# Patient Record
Sex: MALE | Race: WHITE | NOT HISPANIC OR LATINO | URBAN - METROPOLITAN AREA
[De-identification: names, ages, dates, MRNs, and addresses within clinical notes are randomized per-mention and may not be internally consistent; named-entity substitution may affect disease eponyms.]

---

## 2023-01-27 ENCOUNTER — HOSPITAL ENCOUNTER (EMERGENCY)
Facility: HOSPITAL | Age: 23
Discharge: HOME | End: 2023-01-27
Attending: EMERGENCY MEDICINE

## 2023-01-27 VITALS
OXYGEN SATURATION: 100 % | SYSTOLIC BLOOD PRESSURE: 171 MMHG | DIASTOLIC BLOOD PRESSURE: 88 MMHG | HEART RATE: 94 BPM | RESPIRATION RATE: 16 BRPM | TEMPERATURE: 99.2 F | WEIGHT: 185 LBS | BODY MASS INDEX: 29.03 KG/M2 | HEIGHT: 67 IN

## 2023-01-27 DIAGNOSIS — K92.0 HEMATEMESIS: ICD-10-CM

## 2023-01-27 DIAGNOSIS — R19.7 DIARRHEA: ICD-10-CM

## 2023-01-27 DIAGNOSIS — I10 HYPERTENSION: ICD-10-CM

## 2023-01-27 DIAGNOSIS — R10.32 LEFT LOWER QUADRANT ABDOMINAL PAIN: Primary | ICD-10-CM

## 2023-01-27 LAB
ALBUMIN SERPL BCP-MCNC: 4.4 G/DL (ref 3.5–5)
ALP SERPL-CCNC: 63 U/L (ref 46–116)
ALT SERPL W P-5'-P-CCNC: 26 U/L (ref 12–78)
ANION GAP SERPL CALCULATED.3IONS-SCNC: 10 MMOL/L (ref 4–13)
AST SERPL W P-5'-P-CCNC: 43 U/L (ref 5–45)
BASOPHILS # BLD AUTO: 0.05 THOUSANDS/ÂΜL (ref 0–0.1)
BASOPHILS NFR BLD AUTO: 1 % (ref 0–1)
BILIRUB SERPL-MCNC: 0.67 MG/DL (ref 0.2–1)
BUN SERPL-MCNC: 11 MG/DL (ref 5–25)
CALCIUM SERPL-MCNC: 9.6 MG/DL (ref 8.3–10.1)
CHLORIDE SERPL-SCNC: 106 MMOL/L (ref 96–108)
CO2 SERPL-SCNC: 21 MMOL/L (ref 21–32)
CREAT SERPL-MCNC: 1.06 MG/DL (ref 0.6–1.3)
EOSINOPHIL # BLD AUTO: 0.02 THOUSAND/ÂΜL (ref 0–0.61)
EOSINOPHIL NFR BLD AUTO: 0 % (ref 0–6)
ERYTHROCYTE [DISTWIDTH] IN BLOOD BY AUTOMATED COUNT: 11.9 % (ref 11.6–15.1)
GFR SERPL CREATININE-BSD FRML MDRD: 98 ML/MIN/1.73SQ M
GLUCOSE SERPL-MCNC: 73 MG/DL (ref 65–140)
HCT VFR BLD AUTO: 46.9 % (ref 36.5–49.3)
HGB BLD-MCNC: 16 G/DL (ref 12–17)
IMM GRANULOCYTES # BLD AUTO: 0.02 THOUSAND/UL (ref 0–0.2)
IMM GRANULOCYTES NFR BLD AUTO: 0 % (ref 0–2)
LIPASE SERPL-CCNC: 81 U/L (ref 73–393)
LYMPHOCYTES # BLD AUTO: 2.44 THOUSANDS/ÂΜL (ref 0.6–4.47)
LYMPHOCYTES NFR BLD AUTO: 33 % (ref 14–44)
MCH RBC QN AUTO: 31.1 PG (ref 26.8–34.3)
MCHC RBC AUTO-ENTMCNC: 34.1 G/DL (ref 31.4–37.4)
MCV RBC AUTO: 91 FL (ref 82–98)
MONOCYTES # BLD AUTO: 0.93 THOUSAND/ÂΜL (ref 0.17–1.22)
MONOCYTES NFR BLD AUTO: 13 % (ref 4–12)
NEUTROPHILS # BLD AUTO: 4 THOUSANDS/ÂΜL (ref 1.85–7.62)
NEUTS SEG NFR BLD AUTO: 53 % (ref 43–75)
NRBC BLD AUTO-RTO: 0 /100 WBCS
PLATELET # BLD AUTO: 304 THOUSANDS/UL (ref 149–390)
PMV BLD AUTO: 10 FL (ref 8.9–12.7)
POTASSIUM SERPL-SCNC: 3.7 MMOL/L (ref 3.5–5.3)
PROT SERPL-MCNC: 7.9 G/DL (ref 6.4–8.4)
RBC # BLD AUTO: 5.14 MILLION/UL (ref 3.88–5.62)
SODIUM SERPL-SCNC: 137 MMOL/L (ref 135–147)
WBC # BLD AUTO: 7.46 THOUSAND/UL (ref 4.31–10.16)

## 2023-01-27 RX ORDER — ONDANSETRON 2 MG/ML
4 INJECTION INTRAMUSCULAR; INTRAVENOUS ONCE
Status: COMPLETED | OUTPATIENT
Start: 2023-01-27 | End: 2023-01-27

## 2023-01-27 RX ORDER — KETOROLAC TROMETHAMINE 30 MG/ML
15 INJECTION, SOLUTION INTRAMUSCULAR; INTRAVENOUS ONCE
Status: COMPLETED | OUTPATIENT
Start: 2023-01-27 | End: 2023-01-27

## 2023-01-27 RX ORDER — DICYCLOMINE HCL 20 MG
20 TABLET ORAL ONCE
Status: DISCONTINUED | OUTPATIENT
Start: 2023-01-27 | End: 2023-01-27 | Stop reason: HOSPADM

## 2023-01-27 RX ORDER — ONDANSETRON 4 MG/1
4 TABLET, FILM COATED ORAL EVERY 6 HOURS PRN
Qty: 12 TABLET | Refills: 0 | Status: SHIPPED | OUTPATIENT
Start: 2023-01-27

## 2023-01-27 RX ORDER — DICYCLOMINE HCL 20 MG
20 TABLET ORAL 2 TIMES DAILY PRN
Qty: 20 TABLET | Refills: 0 | Status: SHIPPED | OUTPATIENT
Start: 2023-01-27

## 2023-01-27 RX ADMIN — KETOROLAC TROMETHAMINE 15 MG: 30 INJECTION, SOLUTION INTRAMUSCULAR at 18:16

## 2023-01-27 RX ADMIN — ONDANSETRON 4 MG: 2 INJECTION INTRAMUSCULAR; INTRAVENOUS at 18:16

## 2023-01-27 RX ADMIN — SODIUM CHLORIDE 1000 ML: 0.9 INJECTION, SOLUTION INTRAVENOUS at 18:16

## 2023-01-27 NOTE — ED PROVIDER NOTES
History  Chief Complaint   Patient presents with   • Abdominal Pain     Llq pain x approx 1 week associated with nausea and diarrhea  Denies urinary s/s no active vomiting  29-year-old male with no known past medical history presents with abdominal pain  Patient reports that about a month ago he got a "GI bug" that lasted for about a week  He notes severe abdominal pain with multiple episodes of vomiting and diarrhea today  He states that this went away, but his nausea persisted  He states that he has had decreased appetite for the past month  Unsure of weight loss  Patient states that a few days ago the abdominal pain, vomiting, and diarrhea came back  The diarrhea has always been watery  He reports that one episode of vomiting yesterday looked like it was red  Patient states that he did not eat any red foods  Since then, patient has not had any vomiting  He still feels nauseous and has had decreased appetite  Patient states that when he has the abdominal pain it is in his left lower quadrant  It is a crampy pain  Patient has not had any fevers  No sick contacts  No recent travel  Patient has not tried any medications for his symptoms  None       History reviewed  No pertinent past medical history  History reviewed  No pertinent surgical history  History reviewed  No pertinent family history  I have reviewed and agree with the history as documented  E-Cigarette/Vaping     E-Cigarette/Vaping Substances           Review of Systems   Constitutional: Positive for appetite change  Negative for chills, fatigue and fever  HENT: Negative  Eyes: Negative  Respiratory: Negative for cough, chest tightness and shortness of breath  Cardiovascular: Negative for chest pain and palpitations  Gastrointestinal: Positive for abdominal pain, diarrhea, nausea and vomiting  Negative for blood in stool  Endocrine: Negative      Genitourinary: Negative for difficulty urinating and hematuria  Musculoskeletal: Negative for arthralgias and myalgias  Skin: Negative for pallor and rash  Allergic/Immunologic: Negative  Neurological: Negative for dizziness, weakness, light-headedness and headaches  Hematological: Negative  Physical Exam  ED Triage Vitals [01/27/23 1643]   Temperature Pulse Respirations Blood Pressure SpO2   99 2 °F (37 3 °C) 94 16 (!) 171/88 100 %      Temp Source Heart Rate Source Patient Position - Orthostatic VS BP Location FiO2 (%)   Tympanic -- -- -- --      Pain Score       4             Orthostatic Vital Signs  Vitals:    01/27/23 1643   BP: (!) 171/88   Pulse: 94       Physical Exam  Vitals and nursing note reviewed  Constitutional:       General: He is not in acute distress  Appearance: Normal appearance  He is well-developed  He is not ill-appearing  HENT:      Head: Normocephalic and atraumatic  Nose: Nose normal    Eyes:      Conjunctiva/sclera: Conjunctivae normal    Cardiovascular:      Rate and Rhythm: Normal rate and regular rhythm  Pulmonary:      Effort: Pulmonary effort is normal  No respiratory distress  Abdominal:      General: Abdomen is flat  There is no distension  Palpations: Abdomen is soft  Tenderness: There is no abdominal tenderness  There is no guarding or rebound  Musculoskeletal:         General: Normal range of motion  Cervical back: Normal range of motion and neck supple  Skin:     General: Skin is warm and dry  Neurological:      General: No focal deficit present  Mental Status: He is alert and oriented to person, place, and time           ED Medications  Medications   sodium chloride 0 9 % bolus 1,000 mL (0 mL Intravenous Stopped 1/27/23 1905)   ondansetron (ZOFRAN) injection 4 mg (4 mg Intravenous Given 1/27/23 1816)   ketorolac (TORADOL) injection 15 mg (15 mg Intravenous Given 1/27/23 1816)       Diagnostic Studies  Results Reviewed     Procedure Component Value Units Date/Time Comprehensive metabolic panel [[id number]] Collected: 01/27/23 1815    Lab Status: Final result Specimen: Blood from Arm, Left Updated: 01/27/23 1849     Sodium 137 mmol/L      Potassium 3 7 mmol/L      Chloride 106 mmol/L      CO2 21 mmol/L      ANION GAP 10 mmol/L      BUN 11 mg/dL      Creatinine 1 06 mg/dL      Glucose 73 mg/dL      Calcium 9 6 mg/dL      AST 43 U/L      ALT 26 U/L      Alkaline Phosphatase 63 U/L      Total Protein 7 9 g/dL      Albumin 4 4 g/dL      Total Bilirubin 0 67 mg/dL      eGFR 98 ml/min/1 73sq m     Narrative:      [employer] guidelines for Chronic Kidney Disease (CKD):   •  Stage 1 with normal or high GFR (GFR > 90 mL/min/1 73 square meters)  •  Stage 2 Mild CKD (GFR = 60-89 mL/min/1 73 square meters)  •  Stage 3A Moderate CKD (GFR = 45-59 mL/min/1 73 square meters)  •  Stage 3B Moderate CKD (GFR = 30-44 mL/min/1 73 square meters)  •  Stage 4 Severe CKD (GFR = 15-29 mL/min/1 73 square meters)  •  Stage 5 End Stage CKD (GFR <15 mL/min/1 73 square meters)  Note: GFR calculation is accurate only with a steady state creatinine    Lipase [[id number]]  (Normal) Collected: 01/27/23 1815    Lab Status: Final result Specimen: Blood from Arm, Left Updated: 01/27/23 1842     Lipase 81 u/L     CBC and differential [[id number]]  (Abnormal) Collected: 01/27/23 1815    Lab Status: Final result Specimen: Blood from Arm, Left Updated: 01/27/23 1822     WBC 7 46 Thousand/uL      RBC 5 14 Million/uL      Hemoglobin 16 0 g/dL      Hematocrit 46 9 %      MCV 91 fL      MCH 31 1 pg      MCHC 34 1 g/dL      RDW 11 9 %      MPV 10 0 fL      Platelets 797 Thousands/uL      nRBC 0 /100 WBCs      Neutrophils Relative 53 %      Immat GRANS % 0 %      Lymphocytes Relative 33 %      Monocytes Relative 13 %      Eosinophils Relative 0 %      Basophils Relative 1 %      Neutrophils Absolute 4 00 Thousands/µL      Immature Grans Absolute 0 02 Thousand/uL      Lymphocytes Absolute 2 44 Thousands/µL      Monocytes Absolute 0 93 Thousand/µL      Eosinophils Absolute 0 02 Thousand/µL      Basophils Absolute 0 05 Thousands/µL                  No orders to display         Procedures  Procedures      ED Course  ED Course as of 01/28/23 2151   Fri Jan 27, 2023   1850 Nausea better, but still having discomfort  Will add toradol  Pt ok with discharge                                       Medical Decision Making  58-year-old male presents with abdominal pain, vomiting, and diarrhea  Patient is well-appearing  No abdominal tenderness on exam   Differential includes, but is not limited to: IBS, IBD, gastroenteritis, or other acute abdominal process  Will order labs since patient has had symptoms on and off for the past month  Will give IV fluids and medications and reevaluate  Patient will likely need GI follow-up for scope  Patient felt better after medications  He was given a prescription for Bentyl and Zofran  Discussed all results and plans with patient  Recommend follow up with gastroenterology  Return precautions given  All questions answered  Diarrhea: acute illness or injury  Hematemesis: acute illness or injury  Hypertension: acute illness or injury  Left lower quadrant abdominal pain: acute illness or injury  Amount and/or Complexity of Data Reviewed  External Data Reviewed:      Details: Reviewed past medical history  Labs: ordered  Risk  OTC drugs  Prescription drug management              Disposition  Final diagnoses:   Left lower quadrant abdominal pain   Diarrhea   Hematemesis   Hypertension     Time reflects when diagnosis was documented in both MDM as applicable and the Disposition within this note     Time User Action Codes Description Comment    1/27/2023  6:23 PM Shaun Mcbride Add [R10 32] Left lower quadrant abdominal pain     1/27/2023  6:24 PM Shaun Mcbride Add [R19 7] Diarrhea     1/27/2023  6:24 PM Shan Jerez [K92 0] Hematemesis     1/27/2023 6:24 PM Madhu Stevenson Add [I10] Hypertension       ED Disposition     ED Disposition   Discharge    Condition   Good    Date/Time   Fri Jan 27, 2023  6:55 PM    Comment   Gerry Ch discharge to home/self care  Follow-up Information     Follow up With Specialties Details Why Contact Info    Janey Krueger MD Gastroenterology   [address]  [phone]            Discharge Medication List as of 1/27/2023  7:46 PM      START taking these medications    Details   dicyclomine (BENTYL) 20 mg tablet Take 1 tablet (20 mg total) by mouth 2 (two) times a day as needed (abdominal pain), Starting Fri 1/27/2023, Normal      ondansetron (ZOFRAN) 4 mg tablet Take 1 tablet (4 mg total) by mouth every 6 (six) hours as needed for nausea or vomiting, Starting Fri 1/27/2023, Normal               PDMP Review     None           ED Provider  Attending physically available and evaluated Gerry Ch  I managed the patient along with the ED Attending      Electronically Signed by         Adam Bernal DO  01/28/23 3955

## 2023-01-27 NOTE — ED ATTENDING ATTESTATION
1/27/2023  ISusanne MD, saw and evaluated the patient  I have discussed the patient with the resident/non-physician practitioner and agree with the resident's/non-physician practitioner's findings, Plan of Care, and MDM as documented in the resident's/non-physician practitioner's note, except where noted  All available labs and Radiology studies were reviewed  I was present for key portions of any procedure(s) performed by the resident/non-physician practitioner and I was immediately available to provide assistance  At this point I agree with the current assessment done in the Emergency Department  I have conducted an independent evaluation of this patient a history and physical is as follows:    ED Course     18-year-old male, previously healthy, presenting to the emergency department for evaluation of left lower quadrant abdominal pain and diarrhea  Patient states that approximately 1 month ago he had an illness including nausea, vomiting, diarrhea that lasted for approximately a week, was very severe per patient description, and the diarrhea resolved, however the patient has had continuous nausea since that time  Patient states that 3 days ago he had return of vomiting and diarrhea  He states that there was some reddish discoloration in the vomitus like there was blood present  Since that time, the vomiting has improved, however patient has continued to have left lower quadrant abdominal discomfort and watery diarrhea  Patient denies any blood in the diarrhea  Patient denies any mucus in the diarrhea  No fever with either illness  Patient describes the left lower quadrant discomfort as being continuous, not associated with urination or defecation, not truly described as a pain  10 systems reviewed and negative except as noted in the history of present illness  The patient is resting comfortably on a stretcher in no acute respiratory distress  The patient appears nontoxic   HEENT reveals moist mucous membranes  Head is normocephalic and atraumatic  Conjunctiva and sclera are normal  Neck is nontender and supple with full range of motion to flexion, extension, lateral rotation  No meningismus appreciated  No masses are appreciated  Lungs are clear to auscultation bilaterally without any wheezes, rales or rhonchi  Heart is regular rate and rhythm without any murmurs, rubs or gallops  Abdomen is soft and nontender without any rebound or guarding  Extremities appear grossly normal without any significant arthropathy  Patient is awake, alert, and oriented x3  The patient has normal interaction  Cranial nerves grossly intact  Motor is 5 out of 5 bilateral upper and lower extremities  MEDICAL DECISION MAKING    Number and Complexity of Problems  • Differential diagnosis: Differential diagnosis includes repetitive episodes of gastroenteritis, inflammatory bowel disease, more likely Crohn's than ulcerative colitis given lack of family history of ulcerative colitis, inflammatory bowel disease, less likely food intolerance like celiac sprue or lactose intolerance  Episode of upper GI bleeding likely secondary to gastritis versus Dominga-Jackson tear  Patient has no history of significant alcohol use to put him at risk for esophageal varices  Given single episode, unlikely to be bleeding peptic ulcer disease  Medical Decision Making Data  • External documents reviewed: No outside records available for review      No orders to display       Labs Reviewed   CBC AND DIFFERENTIAL - Abnormal       Result Value Ref Range Status    WBC 7 46  4 31 - 10 16 Thousand/uL Final    RBC 5 14  3 88 - 5 62 Million/uL Final    Hemoglobin 16 0  12 0 - 17 0 g/dL Final    Hematocrit 46 9  36 5 - 49 3 % Final    MCV 91  82 - 98 fL Final    MCH 31 1  26 8 - 34 3 pg Final    MCHC 34 1  31 4 - 37 4 g/dL Final    RDW 11 9  11 6 - 15 1 % Final    MPV 10 0  8 9 - 12 7 fL Final    Platelets 716  144 - 390 Thousands/uL Final nRBC 0  /100 WBCs Final    Neutrophils Relative 53  43 - 75 % Final    Immat GRANS % 0  0 - 2 % Final    Lymphocytes Relative 33  14 - 44 % Final    Monocytes Relative 13 (*) 4 - 12 % Final    Eosinophils Relative 0  0 - 6 % Final    Basophils Relative 1  0 - 1 % Final    Neutrophils Absolute 4 00  1 85 - 7 62 Thousands/µL Final    Immature Grans Absolute 0 02  0 00 - 0 20 Thousand/uL Final    Lymphocytes Absolute 2 44  0 60 - 4 47 Thousands/µL Final    Monocytes Absolute 0 93  0 17 - 1 22 Thousand/µL Final    Eosinophils Absolute 0 02  0 00 - 0 61 Thousand/µL Final    Basophils Absolute 0 05  0 00 - 0 10 Thousands/µL Final   LIPASE - Normal    Lipase 81  73 - 393 u/L Final   COMPREHENSIVE METABOLIC PANEL    Sodium 585  135 - 147 mmol/L Final    Potassium 3 7  3 5 - 5 3 mmol/L Final    Chloride 106  96 - 108 mmol/L Final    CO2 21  21 - 32 mmol/L Final    ANION GAP 10  4 - 13 mmol/L Final    BUN 11  5 - 25 mg/dL Final    Creatinine 1 06  0 60 - 1 30 mg/dL Final    Comment: Standardized to IDMS reference method    Glucose 73  65 - 140 mg/dL Final    Comment: If the patient is fasting, the ADA then defines impaired fasting glucose as > 100 mg/dL and diabetes as > or equal to 123 mg/dL  Specimen collection should occur prior to Sulfasalazine administration due to the potential for falsely depressed results  Specimen collection should occur prior to Sulfapyridine administration due to the potential for falsely elevated results  Calcium 9 6  8 3 - 10 1 mg/dL Final    AST 43  5 - 45 U/L Final    Comment: Specimen collection should occur prior to Sulfasalazine administration due to the potential for falsely depressed results  ALT 26  12 - 78 U/L Final    Comment: Specimen collection should occur prior to Sulfasalazine and/or Sulfapyridine administration due to the potential for falsely depressed results       Alkaline Phosphatase 63  46 - 116 U/L Final    Total Protein 7 9  6 4 - 8 4 g/dL Final    Albumin 4 4 3 5 - 5 0 g/dL Final    Total Bilirubin 0 67  0 20 - 1 00 mg/dL Final    Comment: Use of this assay is not recommended for patients undergoing treatment with eltrombopag due to the potential for falsely elevated results  eGFR 98  ml/min/1 73sq m Final    Narrative:     [place] guidelines for Chronic Kidney Disease (CKD):   •  Stage 1 with normal or high GFR (GFR > 90 mL/min/1 73 square meters)  •  Stage 2 Mild CKD (GFR = 60-89 mL/min/1 73 square meters)  •  Stage 3A Moderate CKD (GFR = 45-59 mL/min/1 73 square meters)  •  Stage 3B Moderate CKD (GFR = 30-44 mL/min/1 73 square meters)  •  Stage 4 Severe CKD (GFR = 15-29 mL/min/1 73 square meters)  •  Stage 5 End Stage CKD (GFR <15 mL/min/1 73 square meters)  Note: GFR calculation is accurate only with a steady state creatinine       • Labs reviewed by me are significant for: Normal hemoglobin  Normal BUN  No electrolyte abnormality  • Clinical decision rules/scores are significant for: Glascow Blatchford bleeding score of 0   2007 retrospective data suggest 99 6% of individuals with a low risk score of 0 do not require any medical intervention for upper GI bleed  • Considered admission for: Evaluation of upper GI bleed if hemoglobin significantly low  Treatment and Disposition  ED course: Patient was seen and examined by myself, underwent laboratory evaluation which was reviewed  Patient did not require any acute medication in the emergency department  Recommend follow-up as an outpatient with GI  Shared decision making: Patient is agreeable with plan as outlined above  Code status: Full code                Critical Care Time  Procedures

## 2023-01-28 NOTE — DISCHARGE INSTRUCTIONS
You have been seen for abdominal pain  You should return to the ED if you develop intractable vomiting, diarrhea, worsening pain, or other worsening symptoms  Follow up with GI  Use zofran as needed for nausea and bentyl as needed for pain

## 2023-02-15 ENCOUNTER — CONSULT (OUTPATIENT)
Dept: GASTROENTEROLOGY | Facility: CLINIC | Age: 23
End: 2023-02-15

## 2023-02-15 VITALS
TEMPERATURE: 97.9 F | HEIGHT: 67 IN | DIASTOLIC BLOOD PRESSURE: 64 MMHG | BODY MASS INDEX: 29.6 KG/M2 | WEIGHT: 188.6 LBS | SYSTOLIC BLOOD PRESSURE: 112 MMHG

## 2023-02-15 DIAGNOSIS — R11.2 NAUSEA AND VOMITING, UNSPECIFIED VOMITING TYPE: Primary | ICD-10-CM

## 2023-02-15 DIAGNOSIS — K92.0 HEMATEMESIS WITH NAUSEA: ICD-10-CM

## 2023-02-15 DIAGNOSIS — R10.32 LEFT LOWER QUADRANT ABDOMINAL PAIN: ICD-10-CM

## 2023-02-15 DIAGNOSIS — R19.7 ACUTE DIARRHEA: ICD-10-CM

## 2023-02-15 DIAGNOSIS — R10.9 ACUTE ABDOMINAL PAIN: ICD-10-CM

## 2023-02-15 RX ORDER — OMEPRAZOLE 40 MG/1
40 CAPSULE, DELAYED RELEASE ORAL DAILY
Qty: 30 CAPSULE | Refills: 5 | Status: SHIPPED | OUTPATIENT
Start: 2023-02-15 | End: 2023-08-14

## 2023-02-15 RX ORDER — TRAZODONE HYDROCHLORIDE 50 MG/1
TABLET ORAL
COMMUNITY
Start: 2023-02-02

## 2023-02-15 NOTE — PROGRESS NOTES
[facility] - Outpatient Consultation  Kirsty Hernandez 20 y o  male MRN: [id number]  Encounter: [id number]          ASSESSMENT AND PLAN:      1   Nausea and vomiting  2  Hematemesis  3  Marijuana use    Intermittent symptoms  Happening for the last 2 to 3 months  Patient has been using marijuana for the last 4 months  Differentials include peptic ulcer disease, uncontrolled heartburn, H  pylori infection, gastritis, symptoms related to marijuana use  At this time we will do EGD to investigate further  Start patient on omeprazole 40 mg daily  Further management plan based on investigation results  4   Acute abdominal pain  5  Acute diarrhea  6  Family history of celiac disease    Differentials include onset of celiac disease, IBD, infection  Symptoms are subsiding  At this time we will do stool studies for infection inflammation as well as get celiac serology     Further management plan based on investigation results  RTC 4 months  Grisel Rosa MD  Gastroenterology  [place] [address]  Date: February 15, 2023    - Ambulatory Referral to Gastroenterology  - ECG 12 lead; Future  - omeprazole (PriLOSEC) 40 MG capsule; Take 1 capsule (40 mg total) by mouth daily  Dispense: 30 capsule; Refill: 5  - Calprotectin,Fecal; Future  - Clostridium difficile Toxin/GDH W/Refl To PCR; Future  - Stool culture; Future  - Giardia lamblia, EIA and Ova and Parasites Examination; Future  - CT abdomen pelvis w contrast; Future  - EGD; Future  - Tissue transglutaminase, IgA; Future  - IgA; Future      ______________________________________________________________________    HPI: 79-year-old with no past medical history presents for evaluation  Patient had ER visit recently with nausea, vomiting, diarrhea and abdominal pain  CBC, CMP and lipase were normal     Patient reports that he has been having nausea for the last few months intermittently    Symptoms worsen with meal intake sometimes  He has had vomiting twice  He saw blood in vomiting 1 of those times  This was several weeks ago  No swallowing problems or heartburn  He experiences abdominal pain in the left lower quadrant along with loose stools in the last 2 to 3 weeks  Intermittent symptoms  Loose stools have decreased in frequency  About 1-2 bowel movements per day currently  No blood in stools  Weight stable  Grandmother had colon cancer  Grandfather had pancreatic cancer  He takes ibuprofen intermittently for headaches  Current marijuana use  Occasional alcohol intake  Non-smoker  REVIEW OF SYSTEMS:    CONSTITUTIONAL: Denies any fever, chills, rigors, and weight loss  HEENT: No earache or tinnitus  Denies hearing loss or visual disturbances  CARDIOVASCULAR: No chest pain or palpitations  RESPIRATORY: Denies any cough, hemoptysis, shortness of breath or dyspnea on exertion  GASTROINTESTINAL: As noted in the History of Present Illness  GENITOURINARY: No problems with urination  Denies any hematuria or dysuria  NEUROLOGIC: No dizziness or vertigo, denies headaches  MUSCULOSKELETAL: Denies any muscle or joint pain  SKIN: Denies skin rashes or itching  ENDOCRINE: Denies excessive thirst  Denies intolerance to heat or cold  PSYCHOSOCIAL: Denies depression or anxiety  Denies any recent memory loss  Historical Information   History reviewed  No pertinent past medical history  History reviewed  No pertinent surgical history    Social History   Social History     Substance and Sexual Activity   Alcohol Use Not Currently     Social History     Substance and Sexual Activity   Drug Use Yes   • Types: Marijuana     Social History     Tobacco Use   Smoking Status Never   Smokeless Tobacco Never     Family History   Problem Relation Age of Onset   • Colon cancer Maternal Grandmother        Meds/Allergies       Current Outpatient Medications:   •  dicyclomine (BENTYL) 20 mg tablet  •  omeprazole (PriLOSEC) 40 MG capsule  •  ondansetron (ZOFRAN) 4 mg tablet  •  traZODone (DESYREL) 50 mg tablet    No Known Allergies        Objective     Blood pressure 112/64, temperature 97 9 °F (36 6 °C), temperature source Tympanic, height 5' 7" (1 702 m), weight 85 5 kg (188 lb 9 6 oz)  Body mass index is 29 54 kg/m²  PHYSICAL EXAM:      General Appearance:   Alert, cooperative, no distress   HEENT:   Normocephalic, atraumatic, anicteric      Neck:  Supple, symmetrical, trachea midline   Lungs:   Clear to auscultation bilaterally; no rales, rhonchi or wheezing; respirations unlabored    Heart[de-identified]   Regular rate and rhythm; no murmur, rub, or gallop  Abdomen:   Soft, non-tender, non-distended; normal bowel sounds; no masses, no organomegaly    Genitalia:   Deferred    Rectal:   Deferred    Extremities:  No cyanosis, clubbing or edema    Pulses:  2+ and symmetric    Skin:  No jaundice, rashes, or lesions    Lymph nodes:  No palpable cervical lymphadenopathy        Lab Results:   No visits with results within 1 Day(s) from this visit     Latest known visit with results is:   Admission on 01/27/2023, Discharged on 01/27/2023   Component Date Value   • WBC 01/27/2023 7 46    • RBC 01/27/2023 5 14    • Hemoglobin 01/27/2023 16 0    • Hematocrit 01/27/2023 46 9    • MCV 01/27/2023 91    • MCH 01/27/2023 31 1    • MCHC 01/27/2023 34 1    • RDW 01/27/2023 11 9    • MPV 01/27/2023 10 0    • Platelets 76/99/2268 304    • nRBC 01/27/2023 0    • Neutrophils Relative 01/27/2023 53    • Immat GRANS % 01/27/2023 0    • Lymphocytes Relative 01/27/2023 33    • Monocytes Relative 01/27/2023 13 (H)    • Eosinophils Relative 01/27/2023 0    • Basophils Relative 01/27/2023 1    • Neutrophils Absolute 01/27/2023 4 00    • Immature Grans Absolute 01/27/2023 0 02    • Lymphocytes Absolute 01/27/2023 2 44    • Monocytes Absolute 01/27/2023 0 93    • Eosinophils Absolute 01/27/2023 0 02    • Basophils Absolute 01/27/2023 0 05    • Sodium 01/27/2023 137    • Potassium 01/27/2023 3 7    • Chloride 01/27/2023 106    • CO2 01/27/2023 21    • ANION GAP 01/27/2023 10    • BUN 01/27/2023 11    • Creatinine 01/27/2023 1 06    • Glucose 01/27/2023 73    • Calcium 01/27/2023 9 6    • AST 01/27/2023 43    • ALT 01/27/2023 26    • Alkaline Phosphatase 01/27/2023 63    • Total Protein 01/27/2023 7 9    • Albumin 01/27/2023 4 4    • Total Bilirubin 01/27/2023 0 67    • eGFR 01/27/2023 98    • Lipase 01/27/2023 81          Radiology Results:   No results found

## 2023-02-15 NOTE — PATIENT INSTRUCTIONS
Scheduled date of EGD(as of today):03/24/203  Physician performing EGD:Dr Kramer  Location of EGD:[place]  Instructions reviewed with patient by:Cait  Clearances:  n/a

## 2023-02-20 ENCOUNTER — TELEPHONE (OUTPATIENT)
Dept: GASTROENTEROLOGY | Facility: CLINIC | Age: 23
End: 2023-02-20

## 2023-02-20 NOTE — TELEPHONE ENCOUNTER
Spoke with pt, and he stated he went to [facility] which wouldn't see [facility] orders  I told pt he can  orders if he would still like to go to [facility] but if not going to a [facility] lab the orders are in his chart

## 2023-02-20 NOTE — TELEPHONE ENCOUNTER
Pt calling again stating he went to a [facility] and his orders were not in system  Please verify with pt  Pt can be reached at [phone]

## 2023-02-20 NOTE — TELEPHONE ENCOUNTER
Patients GI provider:  Dr Ronald Moise    Number to return call: [phone]    Reason for call: Pt calling asking if he needs to fast prior to getting his labs done      Scheduled procedure/appointment date if applicable: Procedure: 3/58/4241

## 2023-02-21 ENCOUNTER — APPOINTMENT (OUTPATIENT)
Dept: LAB | Facility: CLINIC | Age: 23
End: 2023-02-21

## 2023-02-21 DIAGNOSIS — R10.32 LEFT LOWER QUADRANT ABDOMINAL PAIN: ICD-10-CM

## 2023-02-21 DIAGNOSIS — R10.9 ACUTE ABDOMINAL PAIN: ICD-10-CM

## 2023-02-21 DIAGNOSIS — R11.2 NAUSEA AND VOMITING, UNSPECIFIED VOMITING TYPE: ICD-10-CM

## 2023-02-21 DIAGNOSIS — R19.7 ACUTE DIARRHEA: ICD-10-CM

## 2023-02-21 DIAGNOSIS — K92.0 HEMATEMESIS WITH NAUSEA: ICD-10-CM

## 2023-02-21 LAB — IGA SERPL-MCNC: 249 MG/DL (ref 70–400)

## 2023-02-22 LAB
ATRIAL RATE: 58 BPM
P AXIS: 41 DEGREES
PR INTERVAL: 132 MS
QRS AXIS: 49 DEGREES
QRSD INTERVAL: 90 MS
QT INTERVAL: 394 MS
QTC INTERVAL: 386 MS
T WAVE AXIS: 33 DEGREES
TTG IGA SER-ACNC: <2 U/ML (ref 0–3)
VENTRICULAR RATE: 58 BPM

## 2023-02-23 DIAGNOSIS — R10.32 LEFT LOWER QUADRANT ABDOMINAL PAIN: ICD-10-CM

## 2023-02-23 RX ORDER — ONDANSETRON 4 MG/1
4 TABLET, FILM COATED ORAL EVERY 6 HOURS PRN
Qty: 30 TABLET | Refills: 0 | Status: SHIPPED | OUTPATIENT
Start: 2023-02-23

## 2023-02-27 ENCOUNTER — APPOINTMENT (OUTPATIENT)
Dept: LAB | Facility: CLINIC | Age: 23
End: 2023-02-27

## 2023-02-27 DIAGNOSIS — R10.32 LEFT LOWER QUADRANT ABDOMINAL PAIN: ICD-10-CM

## 2023-02-27 DIAGNOSIS — R19.7 ACUTE DIARRHEA: ICD-10-CM

## 2023-02-27 DIAGNOSIS — R11.2 NAUSEA AND VOMITING, UNSPECIFIED VOMITING TYPE: ICD-10-CM

## 2023-02-27 DIAGNOSIS — R10.9 ACUTE ABDOMINAL PAIN: ICD-10-CM

## 2023-02-27 DIAGNOSIS — K92.0 HEMATEMESIS WITH NAUSEA: ICD-10-CM

## 2023-02-28 LAB
C DIFF TOX GENS STL QL NAA+PROBE: NEGATIVE
CAMPYLOBACTER DNA SPEC NAA+PROBE: NORMAL
SALMONELLA DNA SPEC QL NAA+PROBE: NORMAL
SHIGA TOXIN STX GENE SPEC NAA+PROBE: NORMAL
SHIGELLA DNA SPEC QL NAA+PROBE: NORMAL

## 2023-03-04 ENCOUNTER — HOSPITAL ENCOUNTER (OUTPATIENT)
Dept: CT IMAGING | Facility: HOSPITAL | Age: 23
Discharge: HOME | End: 2023-03-04
Attending: INTERNAL MEDICINE

## 2023-03-04 DIAGNOSIS — R10.9 ACUTE ABDOMINAL PAIN: ICD-10-CM

## 2023-03-04 DIAGNOSIS — R11.2 NAUSEA AND VOMITING, UNSPECIFIED VOMITING TYPE: ICD-10-CM

## 2023-03-04 DIAGNOSIS — R10.32 LEFT LOWER QUADRANT ABDOMINAL PAIN: ICD-10-CM

## 2023-03-04 DIAGNOSIS — R19.7 ACUTE DIARRHEA: ICD-10-CM

## 2023-03-04 DIAGNOSIS — K92.0 HEMATEMESIS WITH NAUSEA: ICD-10-CM

## 2023-03-04 LAB — CALPROTECTIN STL-MCNT: 58 UG/G (ref 0–120)

## 2023-03-04 RX ADMIN — IOHEXOL 100 ML: 350 INJECTION, SOLUTION INTRAVENOUS at 11:39

## 2023-03-12 DIAGNOSIS — K92.0 HEMATEMESIS WITH NAUSEA: ICD-10-CM

## 2023-03-12 DIAGNOSIS — R11.2 NAUSEA AND VOMITING, UNSPECIFIED VOMITING TYPE: ICD-10-CM

## 2023-03-12 DIAGNOSIS — R10.32 LEFT LOWER QUADRANT ABDOMINAL PAIN: ICD-10-CM

## 2023-03-12 DIAGNOSIS — R10.9 ACUTE ABDOMINAL PAIN: ICD-10-CM

## 2023-03-12 DIAGNOSIS — R19.7 ACUTE DIARRHEA: ICD-10-CM

## 2023-03-13 RX ORDER — OMEPRAZOLE 40 MG/1
40 CAPSULE, DELAYED RELEASE ORAL DAILY
Qty: 30 CAPSULE | Refills: 3 | Status: SHIPPED | OUTPATIENT
Start: 2023-03-13 | End: 2023-03-24 | Stop reason: SDUPTHER

## 2023-03-23 RX ORDER — SODIUM CHLORIDE 9 MG/ML
125 INJECTION, SOLUTION INTRAVENOUS CONTINUOUS
Status: CANCELLED | OUTPATIENT
Start: 2023-03-23

## 2023-03-23 RX ORDER — ONDANSETRON 2 MG/ML
4 INJECTION INTRAMUSCULAR; INTRAVENOUS EVERY 6 HOURS PRN
Status: CANCELLED | OUTPATIENT
Start: 2023-03-23

## 2023-03-24 ENCOUNTER — HOSPITAL ENCOUNTER (OUTPATIENT)
Dept: GASTROENTEROLOGY | Facility: MEDICAL CENTER | Age: 23
Setting detail: OUTPATIENT SURGERY
End: 2023-03-24

## 2023-03-24 ENCOUNTER — ANESTHESIA (OUTPATIENT)
Dept: GASTROENTEROLOGY | Facility: MEDICAL CENTER | Age: 23
End: 2023-03-24

## 2023-03-24 ENCOUNTER — ANESTHESIA EVENT (OUTPATIENT)
Dept: GASTROENTEROLOGY | Facility: MEDICAL CENTER | Age: 23
End: 2023-03-24

## 2023-03-24 VITALS
HEIGHT: 67 IN | BODY MASS INDEX: 29.03 KG/M2 | SYSTOLIC BLOOD PRESSURE: 108 MMHG | DIASTOLIC BLOOD PRESSURE: 57 MMHG | TEMPERATURE: 98.2 F | HEART RATE: 53 BPM | RESPIRATION RATE: 16 BRPM | OXYGEN SATURATION: 100 % | WEIGHT: 185 LBS

## 2023-03-24 DIAGNOSIS — R11.2 NAUSEA AND VOMITING, UNSPECIFIED VOMITING TYPE: ICD-10-CM

## 2023-03-24 DIAGNOSIS — K92.0 HEMATEMESIS WITH NAUSEA: ICD-10-CM

## 2023-03-24 DIAGNOSIS — R19.7 ACUTE DIARRHEA: ICD-10-CM

## 2023-03-24 DIAGNOSIS — R10.9 ACUTE ABDOMINAL PAIN: ICD-10-CM

## 2023-03-24 DIAGNOSIS — R10.32 LEFT LOWER QUADRANT ABDOMINAL PAIN: ICD-10-CM

## 2023-03-24 PROBLEM — Z72.0 VAPES NICOTINE CONTAINING SUBSTANCE: Status: ACTIVE | Noted: 2023-03-24

## 2023-03-24 RX ORDER — PROPOFOL 10 MG/ML
INJECTION, EMULSION INTRAVENOUS AS NEEDED
Status: DISCONTINUED | OUTPATIENT
Start: 2023-03-24 | End: 2023-03-24

## 2023-03-24 RX ORDER — OMEPRAZOLE 40 MG/1
40 CAPSULE, DELAYED RELEASE ORAL
Qty: 60 CAPSULE | Refills: 5 | Status: SHIPPED | OUTPATIENT
Start: 2023-03-24 | End: 2023-09-20

## 2023-03-24 RX ORDER — ONDANSETRON 2 MG/ML
4 INJECTION INTRAMUSCULAR; INTRAVENOUS EVERY 6 HOURS PRN
Status: DISCONTINUED | OUTPATIENT
Start: 2023-03-24 | End: 2023-03-28 | Stop reason: HOSPADM

## 2023-03-24 RX ORDER — LIDOCAINE HYDROCHLORIDE 20 MG/ML
INJECTION, SOLUTION EPIDURAL; INFILTRATION; INTRACAUDAL; PERINEURAL AS NEEDED
Status: DISCONTINUED | OUTPATIENT
Start: 2023-03-24 | End: 2023-03-24

## 2023-03-24 RX ORDER — SODIUM CHLORIDE 9 MG/ML
125 INJECTION, SOLUTION INTRAVENOUS CONTINUOUS
Status: DISCONTINUED | OUTPATIENT
Start: 2023-03-24 | End: 2023-03-28 | Stop reason: HOSPADM

## 2023-03-24 RX ADMIN — SODIUM CHLORIDE 125 ML/HR: 0.9 INJECTION, SOLUTION INTRAVENOUS at 11:31

## 2023-03-24 RX ADMIN — PROPOFOL 50 MG: 10 INJECTION, EMULSION INTRAVENOUS at 11:48

## 2023-03-24 RX ADMIN — PROPOFOL 50 MG: 10 INJECTION, EMULSION INTRAVENOUS at 11:47

## 2023-03-24 RX ADMIN — PROPOFOL 50 MG: 10 INJECTION, EMULSION INTRAVENOUS at 11:49

## 2023-03-24 RX ADMIN — PROPOFOL 200 MG: 10 INJECTION, EMULSION INTRAVENOUS at 11:45

## 2023-03-24 RX ADMIN — LIDOCAINE HYDROCHLORIDE 60 MG: 20 INJECTION, SOLUTION EPIDURAL; INFILTRATION; INTRACAUDAL; PERINEURAL at 11:45

## 2023-03-24 RX ADMIN — PROPOFOL 50 MG: 10 INJECTION, EMULSION INTRAVENOUS at 11:51

## 2023-03-24 NOTE — ANESTHESIA POSTPROCEDURE EVALUATION
Post-Op Assessment Note    CV Status:  Stable    Pain management: adequate     Mental Status:  Alert and awake   Hydration Status:  Euvolemic   PONV Controlled:  Controlled   Airway Patency:  Patent      Post Op Vitals Reviewed: Yes      Staff: Anesthesiologist         No notable events documented      /57 (03/24/23 1212)    Temp      Pulse (!) 53 (03/24/23 1212)   Resp 16 (03/24/23 1212)    SpO2 100 % (03/24/23 1212)

## 2023-03-24 NOTE — ANESTHESIA PREPROCEDURE EVALUATION
Procedure:  EGD    Relevant Problems   ANESTHESIA (within normal limits)      CARDIO (within normal limits)      ENDO (within normal limits)      GI/HEPATIC (within normal limits)      /RENAL (within normal limits)      GYN (within normal limits)      HEMATOLOGY (within normal limits)      MUSCULOSKELETAL (within normal limits)      NEURO/PSYCH (within normal limits)      PULMONARY (within normal limits)      Other   (+) Vapes nicotine containing substance        Physical Exam    Airway    Mallampati score: I  TM Distance: >3 FB  Neck ROM: full     Dental       Cardiovascular  Rhythm: regular, Rate: normal,     Pulmonary  Breath sounds clear to auscultation,     Other Findings        Anesthesia Plan  ASA Score- 2     Anesthesia Type- IV sedation with anesthesia with ASA Monitors  Additional Monitors:   Airway Plan:           Plan Factors-Exercise tolerance (METS): >4 METS  Chart reviewed  Patient summary reviewed  Patient is a current smoker  Patient not instructed to abstain from smoking on day of procedure  Patient smoked on day of surgery  Induction- intravenous  Postoperative Plan-     Informed Consent- Anesthetic plan and risks discussed with patient

## 2023-03-24 NOTE — H&P
History and Physical -  [facility]  Neil Issa 20 y o  male MRN: [id number]                  HPI: Neil Issa is a 20y o  year old male who presents for EGD to investigate nausea, vomiting, hematemesis with abdominal pain and diarrhea  REVIEW OF SYSTEMS: Per the HPI, and otherwise unremarkable  Historical Information   No past medical history on file  No past surgical history on file  Social History   Social History     Substance and Sexual Activity   Alcohol Use Not Currently     Social History     Substance and Sexual Activity   Drug Use Yes   • Types: Marijuana     Social History     Tobacco Use   Smoking Status Never   Smokeless Tobacco Never     Family History   Problem Relation Age of Onset   • Colon cancer Maternal Grandmother        Meds/Allergies     (Not in a hospital admission)      No Known Allergies    Objective     There were no vitals taken for this visit  PHYSICAL EXAM    Gen: NAD  CV: RRR  CHEST: Clear  ABD: soft, NT/ND  EXT: no edema      ASSESSMENT/PLAN:  Neil Issa is a 20y o  year old male who presents for EGD to investigate nausea, vomiting, hematemesis with abdominal pain and diarrhea  The patient is stable and optimized for the procedure, we reviewed risk and benefits  Risk include but not limited to infection, bleeding, perforation and missing a lesion

## 2023-03-30 ENCOUNTER — TELEPHONE (OUTPATIENT)
Dept: GASTROENTEROLOGY | Facility: CLINIC | Age: 23
End: 2023-03-30

## 2023-03-30 NOTE — TELEPHONE ENCOUNTER
Patients GI provider:  Dr Navarro Moder    Number to return call: [phone]    Reason for call: Pt calling having a few questions on his recent EGD 3/24/23        Scheduled procedure/appointment date if applicable: Apt 0/82/1273

## 2023-04-06 DIAGNOSIS — R11.2 NAUSEA AND VOMITING, UNSPECIFIED VOMITING TYPE: Primary | ICD-10-CM

## 2023-04-06 DIAGNOSIS — R10.9 ACUTE ABDOMINAL PAIN: ICD-10-CM

## 2023-04-06 RX ORDER — ONDANSETRON 4 MG/1
4 TABLET, ORALLY DISINTEGRATING ORAL EVERY 6 HOURS PRN
Qty: 60 TABLET | Refills: 0 | Status: SHIPPED | OUTPATIENT
Start: 2023-04-06 | End: 2023-05-06

## 2023-04-21 ENCOUNTER — HOSPITAL ENCOUNTER (OUTPATIENT)
Dept: RADIOLOGY | Facility: HOSPITAL | Age: 23
Discharge: HOME | End: 2023-04-21
Attending: INTERNAL MEDICINE

## 2023-04-21 DIAGNOSIS — R10.9 ACUTE ABDOMINAL PAIN: ICD-10-CM

## 2023-04-21 DIAGNOSIS — R11.2 NAUSEA AND VOMITING, UNSPECIFIED VOMITING TYPE: ICD-10-CM

## 2023-04-25 DIAGNOSIS — K31.84 GASTROPARESIS: Primary | ICD-10-CM

## 2023-05-09 ENCOUNTER — CLINICAL SUPPORT (OUTPATIENT)
Dept: NUTRITION | Facility: HOSPITAL | Age: 23
End: 2023-05-09
Attending: INTERNAL MEDICINE

## 2023-05-09 VITALS — HEIGHT: 67 IN | WEIGHT: 185 LBS | BODY MASS INDEX: 29.03 KG/M2

## 2023-05-09 DIAGNOSIS — K31.84 GASTROPARESIS: ICD-10-CM

## 2023-05-09 NOTE — PROGRESS NOTES
" Nutrition Assessment Form    Patient Name: Anastacia French    YOB: 2000    Sex: Male     Assessment Date: 5/9/2023  Start Time: 9A Stop Time: 9:36 Total Minutes: 36     Data:  Present at session: self   Parent/Patient Concerns/reason for visit:  \" gastroparesis dx a few weeks ago\"   Medical Dx/Reason for Referral:   K31 84 Gastroparesis   No past medical history on file  Current Outpatient Medications   Medication Sig Dispense Refill   • dicyclomine (BENTYL) 20 mg tablet Take 1 tablet (20 mg total) by mouth 2 (two) times a day as needed (abdominal pain) 20 tablet 0   • omeprazole (PriLOSEC) 40 MG capsule TAKE 1 CAPSULE BY MOUTH 2 TIMES A DAY BEFORE MEALS  180 capsule 2   • ondansetron (Zofran ODT) 4 mg disintegrating tablet Take 1 tablet (4 mg total) by mouth every 6 (six) hours as needed for nausea or vomiting 60 tablet 0   • ondansetron (ZOFRAN) 4 mg tablet Take 1 tablet (4 mg total) by mouth every 6 (six) hours as needed for nausea or vomiting 30 tablet 0   • traZODone (DESYREL) 50 mg tablet TAKE 1/2 TABLET BY MOUTH NIGHTLY       No current facility-administered medications for this visit  Additional Meds/Supplements:  none    Special Learning Needs/barriers to learning/any new barriers  none    Height:  5'7\"   Weight:      Wt Readings from Last 10 Encounters:   03/24/23 83 9 kg (185 lb)   02/15/23 85 5 kg (188 lb 9 6 oz)   01/27/23 83 9 kg (185 lb)     Estimated body mass index is 28 98 kg/m² as calculated from the following:    Height as of 3/24/23: 5' 7\" (1 702 m)  Weight as of 3/24/23: 83 9 kg (185 lb)     Recent Weight Change: [x]Yes     []No  Amount:       Energy Needs: [place]- Mozelle Kick Equation:  2100   No Known Allergies or intolerances  NKFA   Social History     Substance and Sexual Activity   Alcohol Use Not Currently    _______x/wk or month  1 or 2 or 3 or 4 or____ drinks/session   Mixed drinks/ wine/ beer- occasionally         Social History     Tobacco Use   Smoking Status " Never   Smokeless Tobacco Never       Who shops? patient and mother- [employer], [employer]    Who cooks/cooking methods/Eating out/take out habits   patient and mother  Cooking methods: bake/mccarthy/air mccarthy/grill/boil/other________    Take out: _0-1__ x/wk or month   Dining out ____ x/wk or month   Exercise: Walk/ run/ bike/ gym/elliptical/other _________  __0-1__ x/wk how many minutes:______   strength training       Other: ie: Sleep habits/ stress level/ work habits household-lives with ?/ food security Sleep habits: 6-7h; not restful   Work habits: not working at present past 2 months  No food security concerns     Prior Nutritional Counseling? []Yes     [x]No  When:      Why:         Diet Hx:  Breakfast:     does not eat breakfast  If he does- cup of coffee, scrambled eggs- cheese, toast (wheat)  Diet:    7:30A    ~8A if he does eat breakfast    Lunch:     salad- spinach; iceberg; cheese, dressing, sunflower seeds, chicken slices   grilled chicken breast         1-1:30p        Dinner:     grilled chicken with green beans or peas, potatoes, variety of veggies  Steak/ground beef  Occasionally salmon      7-7:30     Snacks:    Beverages: water; occaissonal Red Bull, sometimes coffee throughout the day AM - none   PM - occasionally popcorn; nuts  HS - none   Other Notes/ Initial Assessment:  Sometimes will be nauseated when he wakes up in the morning  Nausea will subside by midday; will come back around 6-7p- sometimes before dinner, majority of times it is after he has eaten  Pt reports he did lose weight during the past few months since onset of s/s  Some weight loss was warranted  He wants to return to the gym and begin lifting weights again, he reduced his frequency during onset of s/s; would not workout if he was not feeling well  Patient has done some research into gastroparesis diet prior to appointment   He has begun to remove food items that are normally recommended to eliminate: high fat, greasy, fried foods, alcohol, Does tolerate dairy well; good intake of vegetables, fair intake of fruits  Reviewed handouts for gastroparesis and reviewed items he can try to incorporate back into his diet to limit over-restricting       Updated assessment (Follow up note only):           Nutrition Diagnosis:   Involuntary weight loss  related to Physiological causes increasing nutrient needs due to prolonged catabolic illness, trauma, malabsorption as  evidenced by Poor intake, change in eating habits, early satiety, skipped meals     Altered GI function related to delayed gastric emptying/gastroparesis as evidenced by nausea, early satiety, vomiting, stomach discomfort with eating       Any change or new dx since previous visit:     Medical Nutrition Therapy Intervention:  [x]Individualized Meal Plan: low fat, low fiber, reduce caffeine, no carbonated beverages, small frequent meals []Understanding Lab Values   []Basic Pathophysiology of Disease []Food/Medication Interactions   []Food Diary [x]Exercise: Recommend 150 minutes of moderate intensity exercise per week (or 30 minutes of moderate intensity exercise x5 days per week)   []Lifestyle/Behavior Modification Techniques []Medication, Mechanism of Action   [x]Label Reading: CHO/ Na/ Fat/ other_________ []Self Blood Glucose Monitoring   []Weight/BMI Goals: gain/lose/maintain  184# last week at home     195-200# months ago     185# in office today []Other -           Comprehension: []Excellent  []Very Good  [x]Good  []Fair   []Poor    Receptivity: []Excellent  []Very Good  [x]Good  []Fair   []Poor    Expected Compliance: []Excellent  []Very Good  [x]Good  []Fair   []Poor        Goals (initial)/ Progress made on previous goals/new goals:  1  Armando Gorman will continue to follow low fat, low fiber diet that he has begun to incorporate prior to meeting with RD   2   Armando Gorman will begin to incorporate a breakfast meal everyday; if nauseated upon waking, will try lighter fair (ie toast, oatmeal) in small portions   3  Mable Billings will continue to have adequate water consumption of at least 64oz at minimum, daily  No follow-ups on file  Labs:  CMP  Lab Results   Component Value Date    K 3 7 01/27/2023     01/27/2023    CO2 21 01/27/2023    BUN 11 01/27/2023    CREATININE 1 06 01/27/2023    CALCIUM 9 6 01/27/2023    AST 43 01/27/2023    ALT 26 01/27/2023    ALKPHOS 63 01/27/2023    EGFR 98 01/27/2023       BMP  Lab Results   Component Value Date    CALCIUM 9 6 01/27/2023    K 3 7 01/27/2023    CO2 21 01/27/2023     01/27/2023    BUN 11 01/27/2023    CREATININE 1 06 01/27/2023       Lipids  No results found for: CHOL  No results found for: HDL  No results found for: LDLCALC  No results found for: TRIG  No results found for: CHOLHDL    Hemoglobin A1C  No results found for: HGBA1C    Fasting Glucose  No results found for: GLUF    Insulin     Thyroid  No results found for: TSH, G6PNQTK, I7NOZSL, THYROIDAB    Hepatic Function Panel  Lab Results   Component Value Date    ALT 26 01/27/2023    AST 43 01/27/2023    ALKPHOS 63 01/27/2023       Celiac Disease Antibody Panel  IGA   Date Value Ref Range Status   02/21/2023 249 0 70 0 - 400 0 mg/dL Final     TISSUE TRANSGLUTAMINASE IGA   Date Value Ref Range Status   02/21/2023 <2 0 - 3 U/mL Final     Comment:                                   Negative        0 -  3                                Weak Positive   4 - 10                                Positive           >10   Tissue Transglutaminase (tTG) has been identified   as the endomysial antigen  Studies have demonstr-   ated that endomysial IgA antibodies have over 99%   specificity for gluten sensitive enteropathy        Iron  No results found for: IRON, TIBC, FERRITIN         Franky Chu, [address]  [address] [place] [id number] [phone]

## 2023-06-21 ENCOUNTER — OFFICE VISIT (OUTPATIENT)
Dept: GASTROENTEROLOGY | Facility: CLINIC | Age: 23
End: 2023-06-21
Payer: COMMERCIAL

## 2023-06-21 VITALS
BODY MASS INDEX: 28.72 KG/M2 | WEIGHT: 183 LBS | HEIGHT: 67 IN | SYSTOLIC BLOOD PRESSURE: 118 MMHG | DIASTOLIC BLOOD PRESSURE: 70 MMHG | TEMPERATURE: 97.3 F

## 2023-06-21 DIAGNOSIS — K52.9 CHRONIC DIARRHEA: ICD-10-CM

## 2023-06-21 DIAGNOSIS — K44.9 HIATAL HERNIA: ICD-10-CM

## 2023-06-21 DIAGNOSIS — K20.90 ESOPHAGITIS: ICD-10-CM

## 2023-06-21 DIAGNOSIS — R11.2 NAUSEA AND VOMITING, UNSPECIFIED VOMITING TYPE: Primary | ICD-10-CM

## 2023-06-21 PROCEDURE — 99214 OFFICE O/P EST MOD 30 MIN: CPT | Performed by: INTERNAL MEDICINE

## 2023-06-21 NOTE — PROGRESS NOTES
[facility] - Outpatient Follow-up Note  Solo Otoole 20 y o  male MRN: [id number]  Encounter: [id number]          ASSESSMENT AND PLAN:      1  Nausea and vomiting, unspecified vomiting type  2  GERD with esophagitis  3  Hiatal hernia    Patient continues to have intermittent nausea despite maximal PPI intake  He has small hiatal hernia  I discussed with him about getting pH study to check for any refractory acid reflux and manometry study to check motility of the esophagus  If pH study shows significant reflux then he could be a candidate for hiatal hernia repair  He will need to be off of PPI for 7 days prior to the study  Patient agreeable with plan of care  4   Abnormal stools  Patient has intermittent loose stools  Only 1-2 bowel movements on days of loose stools  We discussed about fiber supplementation which could help give more bulk to the bowel movements  Patient agreeable  Fiber supplementation prescribed  He will take 1 dose daily and if still symptoms uncontrolled then would go to twice daily  RTC 4 months  Liz Sue MD  Gastroenterology  [place] [address]  Date: June 21, 2023    - Espoh manometry/24hr ph; Future  - psyllium (METAMUCIL SMOOTH TEXTURE) 28 % packet; Take 1 packet by mouth 2 (two) times a day  Dispense: 60 packet; Refill: 5    ______________________________________________________________________    SUBJECTIVE: 24-year-old with GERD, esophagitis, hiatal hernia, marijuana use presents for follow-up  During last visit patient reported nausea, vomiting, diarrhea, abdominal pain, marijuana use  Labs reviewed and CBC, CMP and lipase were normal   EGD was performed which showed grade a esophagitis, small hiatal hernia and gastritis  Biopsy from stomach and duodenum did not show any significant disease    Patient had CT abdomen done on 3/4/2023 which was reviewed by me today and did not show any abnormality or cause of "patient's symptoms  Patient had gastric emptying study performed in April 2023 which was reviewed by me today and did not show any abnormality  Stool test for infection inflammation were negative  Celiac serology was ordered in February 2023 and report was reviewed by me today and found to be normal levels serology  Patient reports that vomiting and abdominal pain have resolved  He continues to have intermittent nausea and loose stools  He has 1-2 soft loose bowel movements on days he has diarrhea  No change in symptoms dairy intake  He stopped taking marijuana 1 to 2 months ago as he noticed that his symptoms used to worsen with marijuana use  He is currently taking omeprazole twice daily 40 mg  He is taking Zofran as needed  REVIEW OF SYSTEMS IS OTHERWISE NEGATIVE  Historical Information   History reviewed  No pertinent past medical history  Past Surgical History:   Procedure Laterality Date   • NO PAST SURGERIES       Social History   Social History     Substance and Sexual Activity   Alcohol Use Not Currently     Social History     Substance and Sexual Activity   Drug Use Yes   • Types: Marijuana     Social History     Tobacco Use   Smoking Status Never   Smokeless Tobacco Never     Family History   Problem Relation Age of Onset   • Colon cancer Maternal Grandmother        Meds/Allergies       Current Outpatient Medications:   •  dicyclomine (BENTYL) 20 mg tablet  •  omeprazole (PriLOSEC) 40 MG capsule  •  ondansetron (ZOFRAN) 4 mg tablet  •  psyllium (METAMUCIL SMOOTH TEXTURE) 28 % packet  •  traZODone (DESYREL) 50 mg tablet  •  ondansetron (Zofran ODT) 4 mg disintegrating tablet    No Known Allergies        Objective     Blood pressure 118/70, temperature (!) 97 3 °F (36 3 °C), temperature source Tympanic, height 5' 7\" (1 702 m), weight 83 kg (183 lb)  Body mass index is 28 66 kg/m²        PHYSICAL EXAM:      General Appearance:   Alert, cooperative, no distress   HEENT:   Normocephalic, " atraumatic, anicteric      Neck:  Supple, symmetrical, trachea midline   Lungs:   Clear to auscultation bilaterally; no rales, rhonchi or wheezing; respirations unlabored    Heart[de-identified]   Regular rate and rhythm; no murmur, rub, or gallop  Abdomen:   Soft, non-tender, non-distended; normal bowel sounds; no masses, no organomegaly    Genitalia:   Deferred    Rectal:   Deferred    Extremities:  No cyanosis, clubbing or edema    Pulses:  2+ and symmetric    Skin:  No jaundice, rashes, or lesions    Lymph nodes:  No palpable cervical lymphadenopathy        Lab Results:   No visits with results within 1 Day(s) from this visit  Latest known visit with results is:   Hospital Outpatient Visit on 03/24/2023   Component Date Value   • Case Report 03/24/2023                      Value:Surgical Pathology Report                         Case: [id number]                                   Authorizing Provider:  Emi Malcolm MD         Collected:           03/24/2023 1146              Ordering Location:     [facility]        Received:            03/24/2023 [address] [place] [address] Endoscopy                                                     Pathologist:           Darling Sweet MD                                                         Specimens:   A) - Duodenum, Duodenum R/O celiac                                                                  B) - Stomach, Gastric R/O H pylori                                                        • Final Diagnosis 03/24/2023                      Value: This result contains rich text formatting which cannot be displayed here  • Additional Information 03/24/2023                      Value: This result contains rich text formatting which cannot be displayed here  • Gross Description 03/24/2023                      Value: This result contains rich text formatting which cannot be displayed here  Radiology Results:   No results found

## 2023-06-23 ENCOUNTER — TELEPHONE (OUTPATIENT)
Dept: GASTROENTEROLOGY | Facility: HOSPITAL | Age: 23
End: 2023-06-23

## 2023-07-06 ENCOUNTER — HOSPITAL ENCOUNTER (OUTPATIENT)
Dept: GASTROENTEROLOGY | Facility: HOSPITAL | Age: 23
End: 2023-07-06
Attending: INTERNAL MEDICINE
Payer: COMMERCIAL

## 2023-07-06 VITALS
OXYGEN SATURATION: 98 % | DIASTOLIC BLOOD PRESSURE: 82 MMHG | SYSTOLIC BLOOD PRESSURE: 144 MMHG | HEART RATE: 92 BPM | RESPIRATION RATE: 16 BRPM | TEMPERATURE: 98.9 F

## 2023-07-06 DIAGNOSIS — K20.90 ESOPHAGITIS: ICD-10-CM

## 2023-07-06 DIAGNOSIS — R11.2 NAUSEA AND VOMITING, UNSPECIFIED VOMITING TYPE: ICD-10-CM

## 2023-07-06 PROCEDURE — 91010 ESOPHAGUS MOTILITY STUDY: CPT

## 2023-07-06 PROCEDURE — 91038 ESOPH IMPED FUNCT TEST > 1HR: CPT

## 2023-07-06 NOTE — PERIOPERATIVE NURSING NOTE
Patient brought in the room and explained the esophageal manometry procedure. After the confirmation of allergies, Hurricaine one spray given via oral cavity  and  a transnasal insertion of the High Resolution esophageal manometry catheter was inserted via right nostril. Patient given water to drink during the insertion and once the catheter inserted pressure bands of both Upper esophageal sphincter  (UES) and Lower esophageal sphincter ( LES) were observed on the color contour. Patient instructed to take a deep breath to verify placement of the catheter, diaphragmatic pinch noted on inspiration. Catheter was secured to right cheek. Patient was assisted to supine position . Patient was instructed to relax  while acclimating the catheter for about 5 minutes. A 30 second baseline resting pressure was obtained to identify the UES and LES followed by a series of 10 liquid swallows using 5 cc room temperature normal saline to assess esophageal motility and bolus transit. Patient administered 10 viscous swallows using 5 cc viscous solution, 1 multiple rapid drink swallow using 2 cc room temperature normal saline given a total of 5 drinks. Patient instructed to sit up at the edge of the stretcher and given 5 upright liquid swallows using 5 cc room temperature normal saline and 1 rapid drink challenge using 200 cc room temperature water. At the end of the procedure the high resolution esophageal manometry catheter was removed from the nostril intact. sensor PH probe inserted via right nostril and secured. Zephr recorder teachback performed and patient verbalized understanding. Patient instructed to return next day to have probe remove. Discharge instructions given and patient ambulated out of room in stable condition.

## 2023-07-27 ENCOUNTER — TELEPHONE (OUTPATIENT)
Dept: GASTROENTEROLOGY | Facility: CLINIC | Age: 23
End: 2023-07-27

## 2023-07-27 NOTE — TELEPHONE ENCOUNTER
----- Message from Evelyn Rainey sent at 7/27/2023 12:20 PM EDT -----    ----- Message -----  From: Gino Clarke MD  Sent: 7/27/2023  12:09 PM EDT  To: #    I discussed the results with the patient. Patient has had evidence of reflux based on EGD showing esophagitis. He also has a hiatal hernia which is medium sized. At the same time pH study which was done off of PPI for 7 days, failed to show significant reflux. Diagnosis of hypersensitive esophagus was made. Manometry shows ineffective esophageal motility which usually can occur with acid reflux. Given the discordant findings, patient agreeable to see Dr. Royce Lund in the office to discuss further evaluation and plan. Dear staff: Please kindly arrange office visit with Dr. Royce Lund at the earliest possible time. Thank you.

## 2023-08-25 ENCOUNTER — OFFICE VISIT (OUTPATIENT)
Dept: GASTROENTEROLOGY | Facility: CLINIC | Age: 23
End: 2023-08-25
Payer: COMMERCIAL

## 2023-08-25 VITALS
HEIGHT: 67 IN | DIASTOLIC BLOOD PRESSURE: 60 MMHG | WEIGHT: 200 LBS | SYSTOLIC BLOOD PRESSURE: 120 MMHG | TEMPERATURE: 98.7 F | BODY MASS INDEX: 31.39 KG/M2

## 2023-08-25 DIAGNOSIS — R14.2 BURPING: ICD-10-CM

## 2023-08-25 DIAGNOSIS — K44.9 HIATAL HERNIA: ICD-10-CM

## 2023-08-25 DIAGNOSIS — K20.90 ESOPHAGITIS: Primary | ICD-10-CM

## 2023-08-25 PROCEDURE — 99215 OFFICE O/P EST HI 40 MIN: CPT | Performed by: INTERNAL MEDICINE

## 2023-08-25 NOTE — PROGRESS NOTES
[facility]  Progress Note - Lucia Becerra 20 y.o. male MRN: [id number]    Unit/Bed#:  Encounter: [id number]    Assessment/Plan:  1. Esophagitis  2. Burping  3. Hiatal hernia  4. Esophageal dysmotility  - Ambulatory Referral to General Surgery; Future    He is a very pleasant 29-year-old presents here for further management of acid reflux disease. He underwent extensive evaluation including EGD, esophageal manometry, and 24-hour pH study. He was identified to have good correlation with symptoms related to acid reflux disease which have been causing him belching. His symptom index and symptom  association were positive with symptoms of belching and hiccups. He also had gastric emptying study which was within normal limits. The symptoms are debilitating and have not responded to PPI therapy. He would likely benefit from fixing the hiatal hernia and undergoing TIF procedure simultaneously to improve the quality of his life. We discussed multiple treatment options including  surgical fundoplication, and conservative management. He will likely have the best response with surgical TIF in setting of underlying esophageal dysmotility. I will have him get evaluated by Dr. Mayela Barber (surgeon) and Dr. Jose Esquivel discussed with Dr. Nina Dickerson and Dr. Mayela Barber  Objective:     Vitals: Blood pressure 120/60, temperature 98.7 °F (37.1 °C), temperature source Tympanic, height 5' 7" (1.702 m), weight 90.7 kg (200 lb). ,Body mass index is 31.32 kg/m². @[facility]@    Physical Exam:    GEN: wn/wd, NAD  HEENT: MMM, no cervical or supraclavicular LAD, anciteric  CV: RRR, no m/r/g  CHEST: CTA b/l, no w/r/r  ABD: +BS, soft, NT/ND, no hepatosplenomegaly  EXT: no c/c/e  SKIN: no rashes  NEURO: aaox3      Invasive Devices     None                         Lab, Imaging and other studies:     No visits with results within 1 Day(s) from this visit.    Latest known visit with results is:   Hospital Outpatient Visit on 03/24/2023   Component Date Value   • Case Report 03/24/2023                      Value:Surgical Pathology Report                         Case: [id number]                                   Authorizing Provider:  Yolie Biggs MD         Collected:           03/24/2023 1146              Ordering Location:     [facility]        Received:            03/24/2023 [address] [facility] Endoscopy                                                     Pathologist:           Yovana Shahid MD                                                         Specimens:   A) - Duodenum, Duodenum R/O celiac                                                                  B) - Stomach, Gastric R/O H pylori                                                        • Final Diagnosis 03/24/2023                      Value: This result contains rich text formatting which cannot be displayed here. • Additional Information 03/24/2023                      Value: This result contains rich text formatting which cannot be displayed here. • Gross Description 03/24/2023                      Value: This result contains rich text formatting which cannot be displayed here. I have personally reviewed pertinent reports. No current facility-administered medications for this visit.

## 2023-09-06 ENCOUNTER — CONSULT (OUTPATIENT)
Dept: SURGERY | Facility: CLINIC | Age: 23
End: 2023-09-06
Payer: COMMERCIAL

## 2023-09-06 VITALS
SYSTOLIC BLOOD PRESSURE: 117 MMHG | WEIGHT: 202.38 LBS | TEMPERATURE: 97.5 F | HEART RATE: 71 BPM | HEIGHT: 67 IN | BODY MASS INDEX: 31.76 KG/M2 | DIASTOLIC BLOOD PRESSURE: 72 MMHG

## 2023-09-06 DIAGNOSIS — K20.90 ESOPHAGITIS: ICD-10-CM

## 2023-09-06 DIAGNOSIS — K44.9 HIATAL HERNIA: Primary | ICD-10-CM

## 2023-09-06 PROCEDURE — 99205 OFFICE O/P NEW HI 60 MIN: CPT | Performed by: SURGERY

## 2023-09-06 NOTE — PROGRESS NOTES
Assessment/Plan:    Hiatal hernia  25-year-old male with significant reflux, as well as esophagitis in the setting of a hiatal hernia. Plan:  Plan for laparoscopic or robotic paraesophageal hernia repair with intraoperative transoral incision less fundoplication (TIF), and EGD. This will be done in conjunction with Dr. Janene Drake of gastroenterology. I discussed with the patient the possibility of open repair however unlikely, as well as the use of mesh to reinforce the hiatus. We will plan to do this in the near future. The risks and benefits of surgery were discussed and the patient was amenable to proceed, and consent was signed. I discussed specific risks with her including the high risk of hernia recurrence as elucidated in the literature, as well as some initial dysphagia and trouble swallowing in the immediate postoperative. The patient was amenable to this and acknowledged understanding of requiring a postoperative modified diet. I have ordered an upper GI series to further complete his benign foregut work-up. Diagnoses and all orders for this visit:    Hiatal hernia  -     Ambulatory Referral to General Surgery  -     [place] UPPER GI UGI; Future    Esophagitis  -     Ambulatory Referral to General Surgery  -     [place] UPPER GI UGI; Future          Subjective:      Patient ID: Essence Rodrigues is a 20 y.o. male. 25-year-old male presents with esophagitis in the setting of a hiatal hernia. Patient has approximately an 8-month history of worsening nausea, reflux, and chest pain. He has been treated with a PPI which has marginally helped his symptoms. He has atypical chest pain after meals, and worse at night and has gone to sleeping on a wedge pillow. He has been worked up fairly extensively by Swathi Kramer and Janene Drake, with a recent upper endoscopy that showed grade a esophagitis as well as a 2 cm hiatal hernia. Additionally, he underwent 24-hour pH testing as well as esophageal manometry.   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 His 24-hour pH testing showed 50 episodes with fairly good symptom correlation consistent with reflux disease. He is currently on omeprazole, which marginally helps his symptoms. The following portions of the patient's history were reviewed and updated as appropriate:   He  has no past medical history on file. He   Patient Active Problem List    Diagnosis Date Noted   • Esophagitis 08/25/2023   • Burping 08/25/2023   • Hiatal hernia 08/25/2023   • Vapes nicotine containing substance 03/24/2023     He  has a past surgical history that includes No past surgeries and EGD. His family history includes Colon cancer in his maternal grandmother. He  reports that he has never smoked. He has never used smokeless tobacco. He reports that he does not currently use alcohol. He reports current drug use. Drug: Marijuana. Current Outpatient Medications   Medication Sig Dispense Refill   • dicyclomine (BENTYL) 20 mg tablet Take 1 tablet (20 mg total) by mouth 2 (two) times a day as needed (abdominal pain) 20 tablet 0   • omeprazole (PriLOSEC) 40 MG capsule TAKE 1 CAPSULE BY MOUTH 2 TIMES A DAY BEFORE MEALS. 180 capsule 2   • ondansetron (Zofran ODT) 4 mg disintegrating tablet Take 1 tablet (4 mg total) by mouth every 6 (six) hours as needed for nausea or vomiting 60 tablet 0   • ondansetron (ZOFRAN) 4 mg tablet Take 1 tablet (4 mg total) by mouth every 6 (six) hours as needed for nausea or vomiting 30 tablet 0   • psyllium (METAMUCIL SMOOTH TEXTURE) 28 % packet Take 1 packet by mouth 2 (two) times a day 60 packet 5   • traZODone (DESYREL) 50 mg tablet TAKE 1/2 TABLET BY MOUTH NIGHTLY       No current facility-administered medications for this visit.      Current Outpatient Medications on File Prior to Visit   Medication Sig   • dicyclomine (BENTYL) 20 mg tablet Take 1 tablet (20 mg total) by mouth 2 (two) times a day as needed (abdominal pain)   • omeprazole (PriLOSEC) 40 MG capsule TAKE 1 CAPSULE BY MOUTH 2 TIMES A DAY BEFORE MEALS. • ondansetron (Zofran ODT) 4 mg disintegrating tablet Take 1 tablet (4 mg total) by mouth every 6 (six) hours as needed for nausea or vomiting   • ondansetron (ZOFRAN) 4 mg tablet Take 1 tablet (4 mg total) by mouth every 6 (six) hours as needed for nausea or vomiting   • psyllium (METAMUCIL SMOOTH TEXTURE) 28 % packet Take 1 packet by mouth 2 (two) times a day   • traZODone (DESYREL) 50 mg tablet TAKE 1/2 TABLET BY MOUTH NIGHTLY     No current facility-administered medications on file prior to visit. He has No Known Allergies. .    Review of Systems   Constitutional: Negative for appetite change, chills, diaphoresis and fever. HENT: Negative for nosebleeds and trouble swallowing. Eyes: Negative. Respiratory: Negative for cough, shortness of breath and wheezing. Cardiovascular: Negative for chest pain, palpitations and leg swelling. Gastrointestinal: Negative for abdominal distention, abdominal pain, nausea and vomiting. Genitourinary: Negative for difficulty urinating, flank pain and frequency. Musculoskeletal: Negative for arthralgias, joint swelling and myalgias. Skin: Negative for pallor and rash. Neurological: Negative for dizziness, facial asymmetry and speech difficulty. Hematological: Does not bruise/bleed easily. Psychiatric/Behavioral: Negative for agitation and confusion. All other systems reviewed and are negative. Objective:      /72 (BP Location: Left arm, Patient Position: Sitting, Cuff Size: Standard)   Pulse 71   Temp 97.5 °F (36.4 °C) (Skin)   Ht 5' 7" (1.702 m)   Wt 91.8 kg (202 lb 6 oz)   BMI 31.70 kg/m²          Physical Exam  Vitals and nursing note reviewed. Constitutional:       General: He is not in acute distress. Appearance: Normal appearance. He is not toxic-appearing.    HENT:      Head: Normocephalic and atraumatic. Mouth/Throat:      Mouth: Mucous membranes are moist.   Eyes:      Extraocular Movements: Extraocular movements intact. Pupils: Pupils are equal, round, and reactive to light. Cardiovascular:      Rate and Rhythm: Normal rate and regular rhythm. Pulses: Normal pulses. Pulmonary:      Effort: Pulmonary effort is normal. No respiratory distress. Breath sounds: Normal breath sounds. No wheezing. Abdominal:      General: There is no distension. Palpations: Abdomen is soft. There is no mass. Tenderness: There is no abdominal tenderness. There is no guarding or rebound. Hernia: No hernia is present. Musculoskeletal:         General: No swelling or deformity. Normal range of motion. Cervical back: Normal range of motion and neck supple. Right lower leg: No edema. Left lower leg: No edema. Skin:     General: Skin is warm and dry. Coloration: Skin is not jaundiced. Neurological:      General: No focal deficit present. Mental Status: He is alert and oriented to person, place, and time.    Psychiatric:         Mood and Affect: Mood normal.         Behavior: Behavior normal.

## 2023-09-06 NOTE — ASSESSMENT & PLAN NOTE
76-year-old male with significant reflux, as well as esophagitis in the setting of a hiatal hernia. Plan:  Plan for laparoscopic or robotic paraesophageal hernia repair with intraoperative transoral incision less fundoplication (TIF), and EGD. This will be done in conjunction with Dr. Ahsan Reynolds of gastroenterology. I discussed with the patient the possibility of open repair however unlikely, as well as the use of mesh to reinforce the hiatus. We will plan to do this in the near future. The risks and benefits of surgery were discussed and the patient was amenable to proceed, and consent was signed. I discussed specific risks with her including the high risk of hernia recurrence as elucidated in the literature, as well as some initial dysphagia and trouble swallowing in the immediate postoperative. The patient was amenable to this and acknowledged understanding of requiring a postoperative modified diet. I have ordered an upper GI series to further complete his benign foregut work-up.

## 2023-09-12 ENCOUNTER — TELEPHONE (OUTPATIENT)
Dept: SURGERY | Facility: CLINIC | Age: 23
End: 2023-09-12

## 2023-09-12 ENCOUNTER — TELEPHONE (OUTPATIENT)
Age: 23
End: 2023-09-12

## 2023-09-12 NOTE — TELEPHONE ENCOUNTER
Spoke to Janet at [employer] [facility] / [address] [place] [address] office to see who his Aura Stanford is for this patient.

## 2023-09-12 NOTE — TELEPHONE ENCOUNTER
Patients GI provider:  Dr. Shelby Thompson    Number to return call: [phone]    Reason for call: Jakob Montez calling from [facility] requesting a call back in regards to scheduling TIFF procedure with . Jakob Montez states it can either be her or [place]. Call back number is above.     Scheduled procedure/appointment date if applicable: N/A

## 2023-09-14 NOTE — TELEPHONE ENCOUNTER
Joint case will be on 11/14/23. **Dr. Ashley Hathaway - please enter order for TIF procedure.  Thanks

## 2023-09-14 NOTE — TELEPHONE ENCOUNTER
Spoke with Chelsie Miugel at Dr. Keyur Morel office and advised that Dr. Jamaica Mcgregor is not doing TIF procedures yet. She will discuss with Dr. Maurilio Queen and let me know how they want to proceed.

## 2023-10-03 ENCOUNTER — HOSPITAL ENCOUNTER (OUTPATIENT)
Dept: RADIOLOGY | Facility: HOSPITAL | Age: 23
Discharge: HOME | End: 2023-10-03
Attending: SURGERY
Payer: COMMERCIAL

## 2023-10-03 DIAGNOSIS — K20.90 ESOPHAGITIS: ICD-10-CM

## 2023-10-03 DIAGNOSIS — K44.9 HIATAL HERNIA: ICD-10-CM

## 2023-10-03 PROCEDURE — 74240 X-RAY XM UPR GI TRC 1CNTRST: CPT

## 2023-10-27 ENCOUNTER — LAB REQUISITION (OUTPATIENT)
Dept: LAB | Facility: HOSPITAL | Age: 23
End: 2023-10-27
Payer: COMMERCIAL

## 2023-10-27 ENCOUNTER — APPOINTMENT (OUTPATIENT)
Dept: LAB | Facility: CLINIC | Age: 23
End: 2023-10-27
Payer: COMMERCIAL

## 2023-10-27 DIAGNOSIS — K44.9 HIATAL HERNIA: ICD-10-CM

## 2023-10-27 DIAGNOSIS — K21.9 GASTRO-ESOPHAGEAL REFLUX DISEASE WITHOUT ESOPHAGITIS: ICD-10-CM

## 2023-10-27 DIAGNOSIS — K21.9 GASTROESOPHAGEAL REFLUX DISEASE: ICD-10-CM

## 2023-10-27 LAB
ABO GROUP BLD: NORMAL
ALBUMIN SERPL BCP-MCNC: 4.5 G/DL (ref 3.5–5)
ALP SERPL-CCNC: 49 U/L (ref 34–104)
ALT SERPL W P-5'-P-CCNC: 16 U/L (ref 7–52)
ANION GAP SERPL CALCULATED.3IONS-SCNC: 10 MMOL/L
APTT PPP: 30 SECONDS (ref 23–37)
AST SERPL W P-5'-P-CCNC: 24 U/L (ref 13–39)
BASOPHILS # BLD AUTO: 0.03 THOUSANDS/ÂΜL (ref 0–0.1)
BASOPHILS NFR BLD AUTO: 1 % (ref 0–1)
BILIRUB SERPL-MCNC: 0.31 MG/DL (ref 0.2–1)
BLD GP AB SCN SERPL QL: NEGATIVE
BUN SERPL-MCNC: 15 MG/DL (ref 5–25)
CALCIUM SERPL-MCNC: 9.5 MG/DL (ref 8.4–10.2)
CHLORIDE SERPL-SCNC: 103 MMOL/L (ref 96–108)
CO2 SERPL-SCNC: 28 MMOL/L (ref 21–32)
CREAT SERPL-MCNC: 1.1 MG/DL (ref 0.6–1.3)
EOSINOPHIL # BLD AUTO: 0.22 THOUSAND/ÂΜL (ref 0–0.61)
EOSINOPHIL NFR BLD AUTO: 4 % (ref 0–6)
ERYTHROCYTE [DISTWIDTH] IN BLOOD BY AUTOMATED COUNT: 12.4 % (ref 11.6–15.1)
GFR SERPL CREATININE-BSD FRML MDRD: 93 ML/MIN/1.73SQ M
GLUCOSE P FAST SERPL-MCNC: 100 MG/DL (ref 65–99)
HCT VFR BLD AUTO: 47 % (ref 36.5–49.3)
HGB BLD-MCNC: 16.3 G/DL (ref 12–17)
IMM GRANULOCYTES # BLD AUTO: 0.01 THOUSAND/UL (ref 0–0.2)
IMM GRANULOCYTES NFR BLD AUTO: 0 % (ref 0–2)
INR PPP: 0.95 (ref 0.84–1.19)
LYMPHOCYTES # BLD AUTO: 2.23 THOUSANDS/ÂΜL (ref 0.6–4.47)
LYMPHOCYTES NFR BLD AUTO: 40 % (ref 14–44)
MCH RBC QN AUTO: 32.9 PG (ref 26.8–34.3)
MCHC RBC AUTO-ENTMCNC: 34.7 G/DL (ref 31.4–37.4)
MCV RBC AUTO: 95 FL (ref 82–98)
MONOCYTES # BLD AUTO: 0.98 THOUSAND/ÂΜL (ref 0.17–1.22)
MONOCYTES NFR BLD AUTO: 18 % (ref 4–12)
NEUTROPHILS # BLD AUTO: 2.05 THOUSANDS/ÂΜL (ref 1.85–7.62)
NEUTS SEG NFR BLD AUTO: 37 % (ref 43–75)
NRBC BLD AUTO-RTO: 0 /100 WBCS
PLATELET # BLD AUTO: 278 THOUSANDS/UL (ref 149–390)
PMV BLD AUTO: 10.9 FL (ref 8.9–12.7)
POTASSIUM SERPL-SCNC: 4.4 MMOL/L (ref 3.5–5.3)
PROT SERPL-MCNC: 7.5 G/DL (ref 6.4–8.4)
PROTHROMBIN TIME: 12.6 SECONDS (ref 11.6–14.5)
RBC # BLD AUTO: 4.96 MILLION/UL (ref 3.88–5.62)
RH BLD: POSITIVE
SODIUM SERPL-SCNC: 141 MMOL/L (ref 135–147)
SPECIMEN EXPIRATION DATE: NORMAL
WBC # BLD AUTO: 5.52 THOUSAND/UL (ref 4.31–10.16)

## 2023-10-27 PROCEDURE — 85025 COMPLETE CBC W/AUTO DIFF WBC: CPT

## 2023-10-27 PROCEDURE — 86900 BLOOD TYPING SEROLOGIC ABO: CPT | Performed by: SURGERY

## 2023-10-27 PROCEDURE — 86850 RBC ANTIBODY SCREEN: CPT | Performed by: SURGERY

## 2023-10-27 PROCEDURE — 85610 PROTHROMBIN TIME: CPT

## 2023-10-27 PROCEDURE — 86901 BLOOD TYPING SEROLOGIC RH(D): CPT | Performed by: SURGERY

## 2023-10-27 PROCEDURE — 85730 THROMBOPLASTIN TIME PARTIAL: CPT

## 2023-10-27 PROCEDURE — 80053 COMPREHEN METABOLIC PANEL: CPT

## 2023-10-27 PROCEDURE — 36415 COLL VENOUS BLD VENIPUNCTURE: CPT

## 2023-10-27 NOTE — PRE-PROCEDURE INSTRUCTIONS
Pre-Surgery Instructions:   Medication Instructions    dicyclomine (BENTYL) 20 mg tablet Uses PRN- OK to take day of surgery    ondansetron (ZOFRAN) 4 mg tablet Uses PRN- OK to take day of surgery    Medication instructions for day surgery reviewed. Please use only a sip of water to take your instructed medications. Avoid all over the counter vitamins, supplements and NSAIDS for one week prior to surgery per anesthesia guidelines. Tylenol is ok to take as needed. You will receive a call one business day prior to surgery with an arrival time and hospital directions. If your surgery is scheduled on a Monday, the hospital will be calling you on the Friday prior to your surgery. If you have not heard from anyone by 8pm, please call the hospital supervisor through the hospital  at [phone]. Hayden Espinosa [phone]). Do not eat or drink anything after midnight the night before your surgery, including candy, mints, lifesavers, or chewing gum. Do not drink alcohol 24hrs before your surgery. Try not to smoke at least 24hrs before your surgery. Follow the pre surgery showering instructions as listed in the [facility] Surgical Experience Booklet” or otherwise provided by your surgeon's office. Do not use a blade to shave the surgical area 1 week before surgery. It is okay to use a clean electric clippers up to 24 hours before surgery. Do not apply any lotions, creams, including makeup, cologne, deodorant, or perfumes after showering on the day of your surgery. Do not use dry shampoo, hair spray, hair gel, or any type of hair products. No contact lenses, eye make-up, or artificial eyelashes. Remove nail polish, including gel polish, and any artificial, gel, or acrylic nails if possible. Remove all jewelry including rings and body piercing jewelry. Wear causal clothing that is easy to take on and off. Consider your type of surgery. Keep any valuables, jewelry, piercings at home.  Please bring any specially ordered equipment (sling, braces) if indicated. Arrange for a responsible person to drive you to and from the hospital on the day of your surgery. Visitor Guidelines discussed. Call the surgeon's office with any new illnesses, exposures, or additional questions prior to surgery. Please reference your [facility] Surgical Experience Booklet” for additional information to prepare for your upcoming surgery.

## 2023-11-14 ENCOUNTER — APPOINTMENT (OUTPATIENT)
Dept: PERIOP | Facility: HOSPITAL | Age: 23
End: 2023-11-14
Attending: INTERNAL MEDICINE
Payer: COMMERCIAL

## 2023-11-14 ENCOUNTER — HOSPITAL ENCOUNTER (OUTPATIENT)
Facility: HOSPITAL | Age: 23
Setting detail: OUTPATIENT SURGERY
Discharge: HOME | End: 2023-11-15
Attending: SURGERY | Admitting: SURGERY
Payer: COMMERCIAL

## 2023-11-14 ENCOUNTER — ANESTHESIA EVENT (OUTPATIENT)
Dept: PERIOP | Facility: HOSPITAL | Age: 23
End: 2023-11-14
Payer: COMMERCIAL

## 2023-11-14 ENCOUNTER — ANESTHESIA (OUTPATIENT)
Dept: PERIOP | Facility: HOSPITAL | Age: 23
End: 2023-11-14
Payer: COMMERCIAL

## 2023-11-14 DIAGNOSIS — K44.9 HIATAL HERNIA: Primary | ICD-10-CM

## 2023-11-14 DIAGNOSIS — K20.90 ESOPHAGITIS: ICD-10-CM

## 2023-11-14 LAB
ABO GROUP BLD: NORMAL
RH BLD: POSITIVE

## 2023-11-14 PROCEDURE — C9113 INJ PANTOPRAZOLE SODIUM, VIA: HCPCS | Performed by: SURGERY

## 2023-11-14 PROCEDURE — 94760 N-INVAS EAR/PLS OXIMETRY 1: CPT

## 2023-11-14 PROCEDURE — 94664 DEMO&/EVAL PT USE INHALER: CPT

## 2023-11-14 RX ORDER — SODIUM CHLORIDE, SODIUM LACTATE, POTASSIUM CHLORIDE, CALCIUM CHLORIDE 600; 310; 30; 20 MG/100ML; MG/100ML; MG/100ML; MG/100ML
INJECTION, SOLUTION INTRAVENOUS CONTINUOUS PRN
Status: DISCONTINUED | OUTPATIENT
Start: 2023-11-14 | End: 2023-11-14

## 2023-11-14 RX ORDER — CEFAZOLIN SODIUM 1 G/3ML
INJECTION, POWDER, FOR SOLUTION INTRAMUSCULAR; INTRAVENOUS AS NEEDED
Status: DISCONTINUED | OUTPATIENT
Start: 2023-11-14 | End: 2023-11-14

## 2023-11-14 RX ORDER — CEFAZOLIN SODIUM 2 G/50ML
2000 SOLUTION INTRAVENOUS ONCE
Status: DISCONTINUED | OUTPATIENT
Start: 2023-11-14 | End: 2023-11-14

## 2023-11-14 RX ORDER — ONDANSETRON 2 MG/ML
4 INJECTION INTRAMUSCULAR; INTRAVENOUS ONCE AS NEEDED
Status: DISCONTINUED | OUTPATIENT
Start: 2023-11-14 | End: 2023-11-14 | Stop reason: HOSPADM

## 2023-11-14 RX ORDER — HYDROMORPHONE HCL IN WATER/PF 6 MG/30 ML
0.2 PATIENT CONTROLLED ANALGESIA SYRINGE INTRAVENOUS
Status: DISCONTINUED | OUTPATIENT
Start: 2023-11-14 | End: 2023-11-14 | Stop reason: HOSPADM

## 2023-11-14 RX ORDER — MINERAL OIL
OIL (ML) MISCELLANEOUS AS NEEDED
Status: DISCONTINUED | OUTPATIENT
Start: 2023-11-14 | End: 2023-11-14 | Stop reason: HOSPADM

## 2023-11-14 RX ORDER — PANTOPRAZOLE SODIUM 40 MG/10ML
40 INJECTION, POWDER, LYOPHILIZED, FOR SOLUTION INTRAVENOUS
Status: DISCONTINUED | OUTPATIENT
Start: 2023-11-14 | End: 2023-11-15 | Stop reason: HOSPADM

## 2023-11-14 RX ORDER — ENOXAPARIN SODIUM 100 MG/ML
40 INJECTION SUBCUTANEOUS DAILY
Status: DISCONTINUED | OUTPATIENT
Start: 2023-11-14 | End: 2023-11-15 | Stop reason: HOSPADM

## 2023-11-14 RX ORDER — OXYCODONE HCL 5 MG/5 ML
10 SOLUTION, ORAL ORAL EVERY 6 HOURS PRN
Status: DISCONTINUED | OUTPATIENT
Start: 2023-11-14 | End: 2023-11-15 | Stop reason: HOSPADM

## 2023-11-14 RX ORDER — ACETAMINOPHEN 160 MG/5ML
650 SUSPENSION ORAL EVERY 6 HOURS
Status: DISCONTINUED | OUTPATIENT
Start: 2023-11-14 | End: 2023-11-15 | Stop reason: HOSPADM

## 2023-11-14 RX ORDER — PROPOFOL 10 MG/ML
INJECTION, EMULSION INTRAVENOUS CONTINUOUS PRN
Status: DISCONTINUED | OUTPATIENT
Start: 2023-11-14 | End: 2023-11-14

## 2023-11-14 RX ORDER — LIDOCAINE HYDROCHLORIDE 10 MG/ML
INJECTION, SOLUTION EPIDURAL; INFILTRATION; INTRACAUDAL; PERINEURAL AS NEEDED
Status: DISCONTINUED | OUTPATIENT
Start: 2023-11-14 | End: 2023-11-14

## 2023-11-14 RX ORDER — ALBUTEROL SULFATE 2.5 MG/3ML
2.5 SOLUTION RESPIRATORY (INHALATION) ONCE AS NEEDED
Status: DISCONTINUED | OUTPATIENT
Start: 2023-11-14 | End: 2023-11-14 | Stop reason: HOSPADM

## 2023-11-14 RX ORDER — HYDROMORPHONE HCL/PF 1 MG/ML
SYRINGE (ML) INJECTION AS NEEDED
Status: DISCONTINUED | OUTPATIENT
Start: 2023-11-14 | End: 2023-11-14

## 2023-11-14 RX ORDER — PROMETHAZINE HYDROCHLORIDE 25 MG/ML
12.5 INJECTION, SOLUTION INTRAMUSCULAR; INTRAVENOUS ONCE
Status: DISCONTINUED | OUTPATIENT
Start: 2023-11-14 | End: 2023-11-14 | Stop reason: HOSPADM

## 2023-11-14 RX ORDER — FENTANYL CITRATE 50 UG/ML
INJECTION, SOLUTION INTRAMUSCULAR; INTRAVENOUS AS NEEDED
Status: DISCONTINUED | OUTPATIENT
Start: 2023-11-14 | End: 2023-11-14

## 2023-11-14 RX ORDER — SODIUM CHLORIDE, SODIUM LACTATE, POTASSIUM CHLORIDE, CALCIUM CHLORIDE 600; 310; 30; 20 MG/100ML; MG/100ML; MG/100ML; MG/100ML
125 INJECTION, SOLUTION INTRAVENOUS CONTINUOUS
Status: DISCONTINUED | OUTPATIENT
Start: 2023-11-14 | End: 2023-11-15

## 2023-11-14 RX ORDER — DEXAMETHASONE SODIUM PHOSPHATE 10 MG/ML
INJECTION, SOLUTION INTRAMUSCULAR; INTRAVENOUS AS NEEDED
Status: DISCONTINUED | OUTPATIENT
Start: 2023-11-14 | End: 2023-11-14

## 2023-11-14 RX ORDER — ROCURONIUM BROMIDE 10 MG/ML
INJECTION, SOLUTION INTRAVENOUS AS NEEDED
Status: DISCONTINUED | OUTPATIENT
Start: 2023-11-14 | End: 2023-11-14

## 2023-11-14 RX ORDER — SUCRALFATE 1 G/1
1 TABLET ORAL
Status: DISCONTINUED | OUTPATIENT
Start: 2023-11-14 | End: 2023-11-15 | Stop reason: HOSPADM

## 2023-11-14 RX ORDER — FENTANYL CITRATE/PF 50 MCG/ML
50 SYRINGE (ML) INJECTION
Status: DISCONTINUED | OUTPATIENT
Start: 2023-11-14 | End: 2023-11-14 | Stop reason: HOSPADM

## 2023-11-14 RX ORDER — ONDANSETRON 2 MG/ML
INJECTION INTRAMUSCULAR; INTRAVENOUS AS NEEDED
Status: DISCONTINUED | OUTPATIENT
Start: 2023-11-14 | End: 2023-11-14

## 2023-11-14 RX ORDER — HYDROMORPHONE HCL/PF 1 MG/ML
0.5 SYRINGE (ML) INJECTION EVERY 4 HOURS PRN
Status: DISCONTINUED | OUTPATIENT
Start: 2023-11-14 | End: 2023-11-15 | Stop reason: HOSPADM

## 2023-11-14 RX ORDER — MIDAZOLAM HYDROCHLORIDE 2 MG/2ML
INJECTION, SOLUTION INTRAMUSCULAR; INTRAVENOUS AS NEEDED
Status: DISCONTINUED | OUTPATIENT
Start: 2023-11-14 | End: 2023-11-14

## 2023-11-14 RX ORDER — BUPIVACAINE HYDROCHLORIDE 2.5 MG/ML
INJECTION, SOLUTION EPIDURAL; INFILTRATION; INTRACAUDAL AS NEEDED
Status: DISCONTINUED | OUTPATIENT
Start: 2023-11-14 | End: 2023-11-14 | Stop reason: HOSPADM

## 2023-11-14 RX ORDER — PROPOFOL 10 MG/ML
INJECTION, EMULSION INTRAVENOUS AS NEEDED
Status: DISCONTINUED | OUTPATIENT
Start: 2023-11-14 | End: 2023-11-14

## 2023-11-14 RX ORDER — ONDANSETRON 2 MG/ML
4 INJECTION INTRAMUSCULAR; INTRAVENOUS EVERY 4 HOURS PRN
Status: DISCONTINUED | OUTPATIENT
Start: 2023-11-14 | End: 2023-11-15 | Stop reason: HOSPADM

## 2023-11-14 RX ORDER — OXYCODONE HCL 5 MG/5 ML
5 SOLUTION, ORAL ORAL EVERY 6 HOURS PRN
Status: DISCONTINUED | OUTPATIENT
Start: 2023-11-14 | End: 2023-11-15 | Stop reason: HOSPADM

## 2023-11-14 RX ADMIN — SUCRALFATE 1 G: 1 TABLET ORAL at 16:30

## 2023-11-14 RX ADMIN — SODIUM CHLORIDE, SODIUM LACTATE, POTASSIUM CHLORIDE, CALCIUM CHLORIDE: 600; 310; 30; 20 INJECTION, SOLUTION INTRAVENOUS at 08:20

## 2023-11-14 RX ADMIN — MIDAZOLAM 2 MG: 1 INJECTION INTRAMUSCULAR; INTRAVENOUS at 07:59

## 2023-11-14 RX ADMIN — SODIUM CHLORIDE 8 MCG: 9 INJECTION, SOLUTION INTRAVENOUS at 08:50

## 2023-11-14 RX ADMIN — SODIUM CHLORIDE 4 MCG: 9 INJECTION, SOLUTION INTRAVENOUS at 10:14

## 2023-11-14 RX ADMIN — ROCURONIUM BROMIDE 20 MG: 50 INJECTION, SOLUTION INTRAVENOUS at 08:50

## 2023-11-14 RX ADMIN — FENTANYL CITRATE 50 MCG: 50 INJECTION INTRAMUSCULAR; INTRAVENOUS at 08:13

## 2023-11-14 RX ADMIN — ROCURONIUM BROMIDE 10 MG: 50 INJECTION, SOLUTION INTRAVENOUS at 11:01

## 2023-11-14 RX ADMIN — ROCURONIUM BROMIDE 20 MG: 50 INJECTION, SOLUTION INTRAVENOUS at 10:14

## 2023-11-14 RX ADMIN — FENTANYL CITRATE 50 MCG: 50 INJECTION INTRAMUSCULAR; INTRAVENOUS at 08:08

## 2023-11-14 RX ADMIN — ACETAMINOPHEN 650 MG: 650 SUSPENSION ORAL at 23:06

## 2023-11-14 RX ADMIN — PROPOFOL 40 MCG/KG/MIN: 10 INJECTION, EMULSION INTRAVENOUS at 08:20

## 2023-11-14 RX ADMIN — SODIUM CHLORIDE, SODIUM LACTATE, POTASSIUM CHLORIDE, AND CALCIUM CHLORIDE: .6; .31; .03; .02 INJECTION, SOLUTION INTRAVENOUS at 08:02

## 2023-11-14 RX ADMIN — ROCURONIUM BROMIDE 50 MG: 50 INJECTION, SOLUTION INTRAVENOUS at 08:13

## 2023-11-14 RX ADMIN — FENTANYL CITRATE 50 MCG: 50 INJECTION INTRAMUSCULAR; INTRAVENOUS at 14:19

## 2023-11-14 RX ADMIN — ONDANSETRON 4 MG: 2 INJECTION INTRAMUSCULAR; INTRAVENOUS at 11:46

## 2023-11-14 RX ADMIN — PROPOFOL 300 MG: 10 INJECTION, EMULSION INTRAVENOUS at 08:13

## 2023-11-14 RX ADMIN — ROCURONIUM BROMIDE 20 MG: 50 INJECTION, SOLUTION INTRAVENOUS at 09:34

## 2023-11-14 RX ADMIN — PANTOPRAZOLE SODIUM 40 MG: 40 INJECTION, POWDER, FOR SOLUTION INTRAVENOUS at 16:30

## 2023-11-14 RX ADMIN — CEFAZOLIN 2000 MG: 1 INJECTION, POWDER, FOR SOLUTION INTRAMUSCULAR; INTRAVENOUS at 08:44

## 2023-11-14 RX ADMIN — ROCURONIUM BROMIDE 10 MG: 50 INJECTION, SOLUTION INTRAVENOUS at 09:47

## 2023-11-14 RX ADMIN — ROCURONIUM BROMIDE 10 MG: 50 INJECTION, SOLUTION INTRAVENOUS at 10:44

## 2023-11-14 RX ADMIN — SUGAMMADEX 200 MG: 100 INJECTION, SOLUTION INTRAVENOUS at 11:50

## 2023-11-14 RX ADMIN — DEXAMETHASONE SODIUM PHOSPHATE 10 MG: 10 INJECTION, SOLUTION INTRAMUSCULAR; INTRAVENOUS at 08:13

## 2023-11-14 RX ADMIN — OXYCODONE HYDROCHLORIDE 10 MG: 5 SOLUTION ORAL at 16:30

## 2023-11-14 RX ADMIN — SODIUM CHLORIDE 8 MCG: 9 INJECTION, SOLUTION INTRAVENOUS at 08:48

## 2023-11-14 RX ADMIN — ACETAMINOPHEN 650 MG: 650 SUSPENSION ORAL at 16:30

## 2023-11-14 RX ADMIN — HYDROMORPHONE HYDROCHLORIDE 0.5 MG: 1 INJECTION, SOLUTION INTRAMUSCULAR; INTRAVENOUS; SUBCUTANEOUS at 10:22

## 2023-11-14 RX ADMIN — SODIUM CHLORIDE 4 MCG: 9 INJECTION, SOLUTION INTRAVENOUS at 09:47

## 2023-11-14 RX ADMIN — LIDOCAINE HYDROCHLORIDE 80 MG: 10 INJECTION, SOLUTION EPIDURAL; INFILTRATION; INTRACAUDAL at 08:08

## 2023-11-14 NOTE — ANESTHESIA PREPROCEDURE EVALUATION
Procedure:  REPAIR HERNIA PARAESOPHAGEAL  LAPAROSCOPIC; POSSIBLE MESH (Abdomen)  ESOPHAGOGASTRODUODENOSCOPY (EGD) (Abdomen)    Relevant Problems   GI/HEPATIC   (+) Hiatal hernia      MUSCULOSKELETAL   (+) Hiatal hernia        Physical Exam    Airway    Mallampati score: I  TM Distance: >3 FB  Neck ROM: full     Dental   No notable dental hx     Cardiovascular  Cardiovascular exam normal    Pulmonary  Pulmonary exam normal     Other Findings      Anesthesia Plan  ASA Score- 2     Anesthesia Type- general with ASA Monitors. Additional Monitors:     Airway Plan: ETT. Comment: No issues with prev GA  Vaping- no current respiratory complaints. Plan Factors-    Chart reviewed. Existing labs reviewed. Patient summary reviewed. Patient is a current smoker. Patient instructed to abstain from smoking on day of procedure. Patient did not smoke on day of surgery. Induction- intravenous. Postoperative Plan- Plan for postoperative opioid use. Planned trial extubation    Informed Consent- Anesthetic plan and risks discussed with patient. I personally reviewed this patient with the CRNA. Discussed and agreed on the Anesthesia Plan with the CRNA. Zheng Davis

## 2023-11-14 NOTE — PLAN OF CARE
Problem: PAIN - ADULT  Goal: Verbalizes/displays adequate comfort level or baseline comfort level  Description: Interventions:  - Encourage patient to monitor pain and request assistance  - Assess pain using appropriate pain scale  - Administer analgesics based on type and severity of pain and evaluate response  - Implement non-pharmacological measures as appropriate and evaluate response  - Consider cultural and social influences on pain and pain management  - Notify physician/advanced practitioner if interventions unsuccessful or patient reports new pain  Outcome: Progressing     Problem: INFECTION - ADULT  Goal: Absence or prevention of progression during hospitalization  Description: INTERVENTIONS:  - Assess and monitor for signs and symptoms of infection  - Monitor lab/diagnostic results  - Monitor all insertion sites, i.e. indwelling lines, tubes, and drains  - Monitor endotracheal if appropriate and nasal secretions for changes in amount and color  - Springs appropriate cooling/warming therapies per order  - Administer medications as ordered  - Instruct and encourage patient and family to use good hand hygiene technique  - Identify and instruct in appropriate isolation precautions for identified infection/condition  Outcome: Progressing  Goal: Absence of fever/infection during neutropenic period  Description: INTERVENTIONS:  - Monitor WBC    Outcome: Progressing     Problem: SAFETY ADULT  Goal: Patient will remain free of falls  Description: INTERVENTIONS:  - Educate patient/family on patient safety including physical limitations  - Instruct patient to call for assistance with activity   - Consult OT/PT to assist with strengthening/mobility   - Keep Call bell within reach  - Keep bed low and locked with side rails adjusted as appropriate  - Keep care items and personal belongings within reach  - Initiate and maintain comfort rounds  - Make Fall Risk Sign visible to staff  - Offer Toileting every 4 Hours, in advance of need  - Initiate/Maintain 4alarm  - Obtain necessary fall risk management equipment: 4  - Apply yellow socks and bracelet for high fall risk patients  - Consider moving patient to room near nurses station  Outcome: Progressing  Goal: Maintain or return to baseline ADL function  Description: INTERVENTIONS:  -  Assess patient's ability to carry out ADLs; assess patient's baseline for ADL function and identify physical deficits which impact ability to perform ADLs (bathing, care of mouth/teeth, toileting, grooming, dressing, etc.)  - Assess/evaluate cause of self-care deficits   - Assess range of motion  - Assess patient's mobility; develop plan if impaired  - Assess patient's need for assistive devices and provide as appropriate  - Encourage maximum independence but intervene and supervise when necessary  - Involve family in performance of ADLs  - Assess for home care needs following discharge   - Consider OT consult to assist with ADL evaluation and planning for discharge  - Provide patient education as appropriate  Outcome: Progressing  Goal: Maintains/Returns to pre admission functional level  Description: INTERVENTIONS:  - Perform AM-PAC 6 Click Basic Mobility/ Daily Activity assessment daily.  - Set and communicate daily mobility goal to care team and patient/family/caregiver. - Collaborate with rehabilitation services on mobility goals if consulted  - Perform Range of Motion 4 times a day. - Reposition patient every 4 hours.   - Dangle patient 4 times a day  - Stand patient 4 times a day  - Ambulate patient 4 times a day  - Out of bed to chair 4 times a day   - Out of bed for meals 4 times a day  - Out of bed for toileting  - Record patient progress and toleration of activity level   Outcome: Progressing     Problem: DISCHARGE PLANNING  Goal: Discharge to home or other facility with appropriate resources  Description: INTERVENTIONS:  - Identify barriers to discharge w/patient and caregiver  - Arrange for needed discharge resources and transportation as appropriate  - Identify discharge learning needs (meds, wound care, etc.)  - Arrange for interpretive services to assist at discharge as needed  - Refer to Case Management Department for coordinating discharge planning if the patient needs post-hospital services based on physician/advanced practitioner order or complex needs related to functional status, cognitive ability, or social support system  Outcome: Progressing     Problem: Knowledge Deficit  Goal: Patient/family/caregiver demonstrates understanding of disease process, treatment plan, medications, and discharge instructions  Description: Complete learning assessment and assess knowledge base.   Interventions:  - Provide teaching at level of understanding  - Provide teaching via preferred learning methods  Outcome: Progressing

## 2023-11-14 NOTE — ANESTHESIA POSTPROCEDURE EVALUATION
Post-Op Assessment Note    CV Status:  Stable  Pain Score: 0    Pain management: adequate       Mental Status:  Awake   Hydration Status:  Stable   PONV Controlled:  None   Airway Patency:  Patent     Post Op Vitals Reviewed: Yes    No anethesia notable event occurred.     Staff: CRNA               BP   137/78   Temp 97.7F   Pulse 74   Resp 8   SpO2 96% RA

## 2023-11-14 NOTE — H&P
H&P Exam - General Surgery   Ernestine Woody 24 y.o. male MRN: [id number]  Unit/Bed#: OR [place] Encounter: [id number]    Assessment/Plan     Assessment:  24-year-old male with medically refractory reflux in the setting of a hiatal hernia. Plan: We will plan for laparoscopic hiatal hernia repair with intraoperative transoral incision less fundoplication with gastroenterology. Patient is amenable to risk and benefits, and we will proceed back to the operating room expeditiously. History of Present Illness     HPI:  Ernestine Woody is a 24 y.o. male who presents with medically refractory reflux. He has an 8-month history of worsening nausea, reflux and chest pain treated with a PPI. He has atypical chest pain and has been sleeping on a wedge pillow. Work-up revealed grade a esophagitis as well as a 3 cm hiatal hernia. Review of Systems   Constitutional:  Negative for appetite change, chills, diaphoresis and fever. HENT:  Negative for nosebleeds and trouble swallowing. Eyes: Negative. Respiratory:  Negative for cough, shortness of breath and wheezing. Cardiovascular:  Negative for chest pain, palpitations and leg swelling. Gastrointestinal:  Negative for abdominal distention, abdominal pain, nausea and vomiting. Genitourinary:  Negative for difficulty urinating, flank pain and frequency. Musculoskeletal:  Negative for arthralgias, joint swelling and myalgias. Skin:  Negative for pallor and rash. Neurological:  Negative for dizziness, facial asymmetry and speech difficulty. Hematological:  Does not bruise/bleed easily. Psychiatric/Behavioral:  Negative for agitation and confusion. All other systems reviewed and are negative.       Historical Information   Past Medical History:   Diagnosis Date    Asthma     Chilhood    GERD (gastroesophageal reflux disease)      Past Surgical History:   Procedure Laterality Date    EGD      NO PAST SURGERIES      WISDOM TOOTH EXTRACTION       Social History   Social History     Substance and Sexual Activity   Alcohol Use Yes    Comment: Social     Social History     Substance and Sexual Activity   Drug Use Yes    Types: Marijuana     Social History     Tobacco Use   Smoking Status Never   Smokeless Tobacco Never     E-Cigarette/Vaping    E-Cigarette Use Current Every Day User      E-Cigarette/Vaping Substances    Nicotine Yes     Flavoring Yes      Family History:   Family History   Problem Relation Age of Onset    Colon cancer Maternal Grandmother        Meds/Allergies   PTA meds:   Prior to Admission Medications   Prescriptions Last Dose Informant Patient Reported? Taking?   dicyclomine (BENTYL) 20 mg tablet More than a month Self No No   Sig: Take 1 tablet (20 mg total) by mouth 2 (two) times a day as needed (abdominal pain)   ondansetron (ZOFRAN) 4 mg tablet More than a month Self No No   Sig: Take 1 tablet (4 mg total) by mouth every 6 (six) hours as needed for nausea or vomiting      Facility-Administered Medications: None     No Known Allergies    Objective   First Vitals:   Blood Pressure: 139/82 (11/14/23 0602)  Pulse: 74 (11/14/23 0602)  Temperature: 97.8 °F (36.6 °C) (11/14/23 0602)  Temp Source: Temporal (11/14/23 0602)  Respirations: 18 (11/14/23 0602)  Height: 5' 7" (170.2 cm) (10/27/23 0850)  Weight - Scale: 83.9 kg (185 lb) (10/27/23 0850)  SpO2: 100 % (11/14/23 0602)    Current Vitals:   Blood Pressure: 139/82 (11/14/23 0602)  Pulse: 74 (11/14/23 0602)  Temperature: 97.8 °F (36.6 °C) (11/14/23 0602)  Temp Source: Temporal (11/14/23 0602)  Respirations: 18 (11/14/23 0602)  Height: 5' 7" (170.2 cm) (11/14/23 0517)  Weight - Scale: 86.2 kg (190 lb) (11/14/23 0620)  SpO2: 100 % (11/14/23 0602)    No intake or output data in the 24 hours ending 11/14/23 0739    Invasive Devices       None                   Physical Exam  Vitals and nursing note reviewed. Constitutional:       General: He is not in acute distress.      Appearance: He is well-developed. HENT:      Head: Normocephalic and atraumatic. Eyes:      Conjunctiva/sclera: Conjunctivae normal.   Cardiovascular:      Rate and Rhythm: Normal rate and regular rhythm. Heart sounds: No murmur heard. Pulmonary:      Effort: Pulmonary effort is normal. No respiratory distress. Breath sounds: Normal breath sounds. Abdominal:      Palpations: Abdomen is soft. Tenderness: There is no abdominal tenderness. Musculoskeletal:         General: No swelling. Cervical back: Neck supple. Skin:     General: Skin is warm and dry. Capillary Refill: Capillary refill takes less than 2 seconds. Neurological:      General: No focal deficit present. Mental Status: He is alert and oriented to person, place, and time. Psychiatric:         Mood and Affect: Mood normal.         Lab Results: I have personally reviewed pertinent lab results. , CBC: No results found for: "WBC", "HGB", "HCT", "MCV", "PLT", "ADJUSTEDWBC", "RBC", "MCH", "MCHC", "RDW", "MPV", "NRBC", CMP: No results found for: "SODIUM", "K", "CL", "CO2", "ANIONGAP", "BUN", "CREATININE", "GLUCOSE", "CALCIUM", "AST", "ALT", "ALKPHOS", "PROT", "BILITOT", "EGFR", Coagulation: No results found for: "PT", "INR", "APTT", Urinalysis: No results found for: "COLORU", "CLARITYU", "Re[place] Tip", "PHUR", "LEUKOCYTESUR", "NITRITE", "PROTEINUA", "GLUCOSEU", "[facility]", "[place] ", "BLOODU", Amylase: No results found for: "AMYLASE", Lipase: No results found for: "LIPASE"  Imaging: I have personally reviewed pertinent films in PACS  EKG, Pathology, and Other Studies: I have personally reviewed pertinent reports.       Code Status: No Order  Advance Directive and Living Will:      Power of :    POLST:

## 2023-11-14 NOTE — OP NOTE
OPERATIVE REPORT  PATIENT NAME: Ginger Lorenzana    :  2000  MRN: [id number]  Pt Location: [facility] OR ROOM 10    SURGERY DATE: 2023    Surgeon(s) and Role:     * Jessica Gan MD - Primary     * Lorene Mcknight MD - Assisting     * Grace Ritchie MD - Co-surgeon    Preop Diagnosis:  Gastroesophageal reflux disease [K21.9]  Hiatal hernia [K44.9]    Post-Op Diagnosis Codes:     * Gastroesophageal reflux disease [K21.9]     * Hiatal hernia [K44.9]    Procedure(s):  REPAIR HERNIA PARAESOPHAGEAL  LAPAROSCOPIC; POSSIBLE MESH  ESOPHAGOGASTRODUODENOSCOPY (EGD)    Specimen(s):  * No specimens in log *    Estimated Blood Loss:   Minimal    Drains:  Urethral Catheter Non-latex 16 Fr. (Active)   Number of days: 0       [REMOVED] NG/OG/Enteral Tube Orogastric 18 Fr (Removed)   Number of days: 0       Anesthesia Type:   General    Operative Indications:  Gastroesophageal reflux disease [K21.9]  Hiatal hernia [K409]  48-year-old male with a history of medically refractory reflux disease in the setting of a hiatal hernia. After discussion of risks and benefits he opted to proceed to the operating room for planned laparoscopic paraesophageal hernia repair with intraoperative transoral incision less fundoplication with Dr. Jesús Terrell of gastroenterology. Operative Findings:  Small 3 cm type III paraesophageal hernia. Complications:   None    Procedure and Technique:  The patient was correctly identified by name and medical record number in the holding area and brought to the operative suite, where he was placed in lithotomy on the operative table. SCDs were placed and he was given preoperative antibiotics within 1 hour of incision. The patient was prepped and draped in the usual fashion. A timeout was performed. Stab incision was made in the left upper quadrant and a Veress needle was introduced. The abdomen was insufflated with CO2 to a pressure of 15mmHg. The patient tolerated insufflation without complication. Using an optical entry technique a 5 mm port was placed just to the right of the umbilicus. An additional 12mm port was placed in the left upper quadrant and two 5mm ports were placed in the right upper quadrant and the left lower quadrant. Finally a right flank 5 mm port was placed for the liver tractor. Once all of the ports were placed, the liver was retracted with the liver retractor and the hernia was identified and reduced using downward gentle retraction. The patient was placed in steep reverse Trendelenburg to assist with dissection. The pars flaccida was entered using the Harmonic scalpel and dissected up to the right fredo. The hernia sac was then opened, exposing the plane between the hernia sac and the mediastinum. The sac was sharply and bluntly dissected from the pleura laterally on both sides, the aorta posteriorly and the pericardium anteriorly with the harmonic scalpel. After the hernia sac was dissected, the esophagus was mobilized first by dissecting out the right fredo and taking down the phrenoesophageal membrane, making sure to preserve the peritoneal lining of the right fredo. Circumferential mobilization of the esophagus was performed to help establish adequate intra-abdominal esophageal length of 2-3cm. The left fredo and base of the crura were also dissected during the mobilization of the esophagus. Attention was then turned to performing a tension free hiatal closure. The crura were reapproximated with 3 interrupted sutures of #0 Ethibond. At the completion of the hiatal closure, a grasper was easily passed through the hiatus and the closure did not appear to be too tight. We then performed an upper endoscopy. An adult endoscope was placed the oropharynx down the esophagus into the stomach. There was minimal resistance going through the GE junction, and the scope was visualized intra-abdominally to be at the GE junction.   There was no obvious pathology in the abdomen the scope was class into the 1st portion of the duodenum with bile noted. On retroflexion in the there was a Hill grade 1. After ensuring hemostasis, all ports were removed under direct visualization and no bleeding was noted. The left upper quadrant 12 mm port port was closed using #0 Vicryl Figure-of-eight suture. All skin incisions were closed with subcuticular #4-0 Monocryl. The wounds were dressed with glue. At this time Dr. Ashley Hathaway came into the operating room to perform the transoral incision less fundoplication. Please see his operative dictation for further details of the procedure. At the end of the procedure, all instrument and sponge counts were correct x2. The patient was brought to PACU in stable condition. I was present for the entire procedure.     Patient Disposition:  PACU  and hemodynamically stable        SIGNATURE: Kanwal Disla MD  DATE: November 14, 2023  TIME: 11:21 AM

## 2023-11-14 NOTE — RESPIRATORY THERAPY NOTE
RT Protocol Note  Peter Daley 24 y.o. male MRN: [id number]  Unit/Bed#: NG1 865-01 Encounter: [id number]    Assessment    Active Problems: There are no active Hospital Problems. Home Pulmonary Medications:  none       Past Medical History:   Diagnosis Date    Asthma     Chilhood    GERD (gastroesophageal reflux disease)      Social History     Socioeconomic History    Marital status: Single     Spouse name: None    Number of children: None    Years of education: None    Highest education level: None   Occupational History    None   Tobacco Use    Smoking status: Never    Smokeless tobacco: Never   Vaping Use    Vaping Use: Every day    Substances: Nicotine, Flavoring   Substance and Sexual Activity    Alcohol use: Yes     Comment: Social    Drug use: Yes     Types: Marijuana    Sexual activity: None   Other Topics Concern    None   Social History Narrative    None     Social Determinants of Health     Financial Resource Strain: Not on file   Food Insecurity: Not on file   Transportation Needs: Not on file   Physical Activity: Not on file   Stress: Not on file   Social Connections: Not on file   Intimate Partner Violence: Not on file   Housing Stability: Not on file       Subjective         Objective    Physical Exam:   Assessment Type: Assess only  General Appearance: Awake  Respiratory Pattern: Normal  Chest Assessment: Chest expansion symmetrical  Bilateral Breath Sounds: Clear  Cough: None    Vitals:  Blood pressure 131/74, pulse 61, temperature 98.1 °F (36.7 °C), temperature source Oral, resp. rate 16, height 5' 7" (1.702 m), weight 86.2 kg (190 lb), SpO2 99 %.           Imaging and other studies:           Plan    Respiratory Plan: No distress/Pulmonary history        Resp Comments: pt assesses per resp protocol; pt s/p EGD paraesophogeal hernia repair; pt has no pulm hx, nor used any pulm meds at home; denies any resp issues;breathing is good; O2 sats good;BS clear; bronchodilators not indicated; will DC protocol

## 2023-11-15 VITALS
SYSTOLIC BLOOD PRESSURE: 117 MMHG | HEIGHT: 67 IN | OXYGEN SATURATION: 96 % | TEMPERATURE: 98.1 F | BODY MASS INDEX: 29.82 KG/M2 | DIASTOLIC BLOOD PRESSURE: 66 MMHG | RESPIRATION RATE: 17 BRPM | HEART RATE: 61 BPM | WEIGHT: 190 LBS

## 2023-11-15 LAB
ANION GAP SERPL CALCULATED.3IONS-SCNC: 12 MMOL/L
BASOPHILS # BLD AUTO: 0.02 THOUSANDS/ÂΜL (ref 0–0.1)
BASOPHILS NFR BLD AUTO: 0 % (ref 0–1)
BUN SERPL-MCNC: 8 MG/DL (ref 5–25)
CALCIUM SERPL-MCNC: 9.3 MG/DL (ref 8.4–10.2)
CHLORIDE SERPL-SCNC: 105 MMOL/L (ref 96–108)
CO2 SERPL-SCNC: 24 MMOL/L (ref 21–32)
CREAT SERPL-MCNC: 0.84 MG/DL (ref 0.6–1.3)
EOSINOPHIL # BLD AUTO: 0.01 THOUSAND/ÂΜL (ref 0–0.61)
EOSINOPHIL NFR BLD AUTO: 0 % (ref 0–6)
ERYTHROCYTE [DISTWIDTH] IN BLOOD BY AUTOMATED COUNT: 12.8 % (ref 11.6–15.1)
GFR SERPL CREATININE-BSD FRML MDRD: 122 ML/MIN/1.73SQ M
GLUCOSE SERPL-MCNC: 99 MG/DL (ref 65–140)
HCT VFR BLD AUTO: 43.7 % (ref 36.5–49.3)
HGB BLD-MCNC: 14.6 G/DL (ref 12–17)
IMM GRANULOCYTES # BLD AUTO: 0.04 THOUSAND/UL (ref 0–0.2)
IMM GRANULOCYTES NFR BLD AUTO: 0 % (ref 0–2)
LYMPHOCYTES # BLD AUTO: 2.55 THOUSANDS/ÂΜL (ref 0.6–4.47)
LYMPHOCYTES NFR BLD AUTO: 20 % (ref 14–44)
MAGNESIUM SERPL-MCNC: 2.1 MG/DL (ref 1.9–2.7)
MCH RBC QN AUTO: 32.3 PG (ref 26.8–34.3)
MCHC RBC AUTO-ENTMCNC: 33.4 G/DL (ref 31.4–37.4)
MCV RBC AUTO: 97 FL (ref 82–98)
MONOCYTES # BLD AUTO: 1.74 THOUSAND/ÂΜL (ref 0.17–1.22)
MONOCYTES NFR BLD AUTO: 13 % (ref 4–12)
NEUTROPHILS # BLD AUTO: 8.59 THOUSANDS/ÂΜL (ref 1.85–7.62)
NEUTS SEG NFR BLD AUTO: 67 % (ref 43–75)
NRBC BLD AUTO-RTO: 0 /100 WBCS
PHOSPHATE SERPL-MCNC: 3.6 MG/DL (ref 2.7–4.5)
PLATELET # BLD AUTO: 299 THOUSANDS/UL (ref 149–390)
PMV BLD AUTO: 11.1 FL (ref 8.9–12.7)
POTASSIUM SERPL-SCNC: 3.8 MMOL/L (ref 3.5–5.3)
RBC # BLD AUTO: 4.52 MILLION/UL (ref 3.88–5.62)
SODIUM SERPL-SCNC: 141 MMOL/L (ref 135–147)
WBC # BLD AUTO: 12.95 THOUSAND/UL (ref 4.31–10.16)

## 2023-11-15 PROCEDURE — C9113 INJ PANTOPRAZOLE SODIUM, VIA: HCPCS | Performed by: SURGERY

## 2023-11-15 PROCEDURE — 43210 EGD ESOPHAGOGASTRC FNDOPLSTY: CPT | Performed by: INTERNAL MEDICINE

## 2023-11-15 PROCEDURE — 84100 ASSAY OF PHOSPHORUS: CPT | Performed by: SURGERY

## 2023-11-15 PROCEDURE — 83735 ASSAY OF MAGNESIUM: CPT | Performed by: SURGERY

## 2023-11-15 PROCEDURE — 43450 DILATE ESOPHAGUS 1/MULT PASS: CPT | Performed by: INTERNAL MEDICINE

## 2023-11-15 PROCEDURE — 80048 BASIC METABOLIC PNL TOTAL CA: CPT | Performed by: SURGERY

## 2023-11-15 PROCEDURE — 85025 COMPLETE CBC W/AUTO DIFF WBC: CPT | Performed by: SURGERY

## 2023-11-15 RX ORDER — ACETAMINOPHEN 160 MG/5ML
650 SUSPENSION ORAL EVERY 6 HOURS
Refills: 0
Start: 2023-11-15

## 2023-11-15 RX ORDER — DEXTROSE MONOHYDRATE, SODIUM CHLORIDE, AND POTASSIUM CHLORIDE 50; 1.49; 4.5 G/1000ML; G/1000ML; G/1000ML
75 INJECTION, SOLUTION INTRAVENOUS CONTINUOUS
Status: DISCONTINUED | OUTPATIENT
Start: 2023-11-15 | End: 2023-11-15 | Stop reason: HOSPADM

## 2023-11-15 RX ORDER — OXYCODONE HCL 5 MG/5 ML
5 SOLUTION, ORAL ORAL EVERY 6 HOURS PRN
Qty: 120 ML | Refills: 0 | Status: SHIPPED | OUTPATIENT
Start: 2023-11-15 | End: 2023-11-25

## 2023-11-15 RX ADMIN — OXYCODONE HYDROCHLORIDE 10 MG: 5 SOLUTION ORAL at 06:12

## 2023-11-15 RX ADMIN — ACETAMINOPHEN 650 MG: 650 SUSPENSION ORAL at 06:08

## 2023-11-15 RX ADMIN — ENOXAPARIN SODIUM 40 MG: 40 INJECTION SUBCUTANEOUS at 09:29

## 2023-11-15 RX ADMIN — PANTOPRAZOLE SODIUM 40 MG: 40 INJECTION, POWDER, FOR SOLUTION INTRAVENOUS at 09:30

## 2023-11-15 RX ADMIN — DEXTROSE, SODIUM CHLORIDE, AND POTASSIUM CHLORIDE 75 ML/HR: 5; .45; .15 INJECTION INTRAVENOUS at 09:29

## 2023-11-15 RX ADMIN — SUCRALFATE 1 G: 1 TABLET ORAL at 09:29

## 2023-11-15 NOTE — PLAN OF CARE
Problem: PAIN - ADULT  Goal: Verbalizes/displays adequate comfort level or baseline comfort level  Description: Interventions:  - Encourage patient to monitor pain and request assistance  - Assess pain using appropriate pain scale  - Administer analgesics based on type and severity of pain and evaluate response  - Implement non-pharmacological measures as appropriate and evaluate response  - Consider cultural and social influences on pain and pain management  - Notify physician/advanced practitioner if interventions unsuccessful or patient reports new pain  Outcome: Progressing     Problem: INFECTION - ADULT  Goal: Absence or prevention of progression during hospitalization  Description: INTERVENTIONS:  - Assess and monitor for signs and symptoms of infection  - Monitor lab/diagnostic results  - Monitor all insertion sites, i.e. indwelling lines, tubes, and drains  - Monitor endotracheal if appropriate and nasal secretions for changes in amount and color  - [place] appropriate cooling/warming therapies per order  - Administer medications as ordered  - Instruct and encourage patient and family to use good hand hygiene technique  - Identify and instruct in appropriate isolation precautions for identified infection/condition  Outcome: Progressing  Goal: Absence of fever/infection during neutropenic period  Description: INTERVENTIONS:  - Monitor WBC    Outcome: Progressing     Problem: SAFETY ADULT  Goal: Patient will remain free of falls  Description: INTERVENTIONS:  - Educate patient/family on patient safety including physical limitations  - Instruct patient to call for assistance with activity   - Consult OT/PT to assist with strengthening/mobility   - Keep Call bell within reach  - Keep bed low and locked with side rails adjusted as appropriate  - Keep care items and personal belongings within reach  - Initiate and maintain comfort rounds  - Make Fall Risk Sign visible to staff  - Offer Toileting every 4 Hours, in advance of need  - Initiate/Maintain 4alarm  - Obtain necessary fall risk management equipment: 4  - Apply yellow socks and bracelet for high fall risk patients  - Consider moving patient to room near nurses station  Outcome: Progressing  Goal: Maintain or return to baseline ADL function  Description: INTERVENTIONS:  -  Assess patient's ability to carry out ADLs; assess patient's baseline for ADL function and identify physical deficits which impact ability to perform ADLs (bathing, care of mouth/teeth, toileting, grooming, dressing, etc.)  - Assess/evaluate cause of self-care deficits   - Assess range of motion  - Assess patient's mobility; develop plan if impaired  - Assess patient's need for assistive devices and provide as appropriate  - Encourage maximum independence but intervene and supervise when necessary  - Involve family in performance of ADLs  - Assess for home care needs following discharge   - Consider OT consult to assist with ADL evaluation and planning for discharge  - Provide patient education as appropriate  Outcome: Progressing  Goal: Maintains/Returns to pre admission functional level  Description: INTERVENTIONS:  - Perform AM-PAC 6 Click Basic Mobility/ Daily Activity assessment daily.  - Set and communicate daily mobility goal to care team and patient/family/caregiver. - Collaborate with rehabilitation services on mobility goals if consulted  - Perform Range of Motion 4 times a day. - Reposition patient every 4 hours.   - Dangle patient 4 times a day  - Stand patient 4 times a day  - Ambulate patient 4 times a day  - Out of bed to chair 4 times a day   - Out of bed for meals 4 times a day  - Out of bed for toileting  - Record patient progress and toleration of activity level   Outcome: Progressing     Problem: DISCHARGE PLANNING  Goal: Discharge to home or other facility with appropriate resources  Description: INTERVENTIONS:  - Identify barriers to discharge w/patient and caregiver  - Arrange for needed discharge resources and transportation as appropriate  - Identify discharge learning needs (meds, wound care, etc.)  - Arrange for interpretive services to assist at discharge as needed  - Refer to Case Management Department for coordinating discharge planning if the patient needs post-hospital services based on physician/advanced practitioner order or complex needs related to functional status, cognitive ability, or social support system  Outcome: Progressing     Problem: Knowledge Deficit  Goal: Patient/family/caregiver demonstrates understanding of disease process, treatment plan, medications, and discharge instructions  Description: Complete learning assessment and assess knowledge base.   Interventions:  - Provide teaching at level of understanding  - Provide teaching via preferred learning methods  Outcome: Progressing

## 2023-11-15 NOTE — DISCHARGE INSTR - AVS FIRST PAGE
Post-Operative Care Instructions             Dr. Elizabeth Motta M.D.    1. General: Theron Brooks will feel pulling sensations around the wound and/or aches and pains around the incisions. This is normal. Even minor surgery is a change in your body and this is your body’s way of reaction to it. If you have had abdominal surgery, it may help to support the incision with a small pillow or blanket for comfort when moving or coughing. 2. Wound care:    Bandage/Dressing - Make sure to remove the bandage in about 48 hours, unless instructed otherwise. You usually don't have to redress the wound after 24-48 hours, unless for comfort. Keep the incision clean and dry. Let air get to it. If the Steri-Strips fall off, just keep the wound clean. Glue - Leave glue alone, it will fall off on its own, no need for an additional dressings    3. Water: You may shower over the wound, unless there are drain tubes left in place. Do not bathe or use a pool or hot tub until cleared by the physician. You may shower right over the staples, glue or Steri-Strips and rinse wound with soapy water but do not scrub incision pat dry when you are done. 4. Activity: You may go up and down stairs, walk as much as you are comfortable, but walk at least 3 times each day. If you have had abdominal or hernia surgery, do not lift anything heavier than 15 pounds for at least 4 weeks. 5. Diet: You may resume a regular diet. If you had a same-day surgery or overnight stay surgery, you may wish to eat lightly for a few days: soups, crackers, and sandwiches. You may resume a regular diet when ready. 6. Medications: Resume all of your previous medications, unless told otherwise by the doctor. Tylenol and ibuoprofen is always fine, unless you are taking any narcotic pain medication containing Tylenol (such as Percocet, Darvocet, Vicodin, or anything containing acetaminophen). Do not take Tylenol if you're taking these medications.  You do not need to take the narcotic pain medications unless you are having significant pain and discomfort. 7. Driving: He will need someone to drive you home on the day of surgery. Do not drive or make any important decisions while on narcotic pain medication or 24 hours and after anesthesia or sedation for surgery. Generally, you may drive when your off all narcotic pain medications, and you can turn in your seat comfortably to check your blind spot. 8. Upset Stomach: You may take Maalox, Tums, or similar items for an upset stomach. If your narcotic pain medication causes an upset stomach, do not take it on an empty stomach. Try taking it with at least some crackers or toast.     9. Constipation: Patients often experienced constipation after surgery. You may take over-the-counter medication for this, such as Metamucil, Senokot, Dulcolax, milk of magnesia, etc. You may take a suppository unless you have had anorectal surgery such as a procedure on your hemorrhoids. If you experience significant nausea or vomiting after abdominal surgery, call the office before trying any of these medications. 10. Call the office: If you are experiencing any of the following, fevers above 101.5°, significant nausea or vomiting, if the wound develops drainage and/or is excessive redness around the wound, or if you have significant diarrhea or other worsening symptoms. 11. Pain: You may be given a prescription for pain. This will be given to the hospital, the day of surgery. 12. Sexual Activity: You may resume sexual activity when you feel ready and comfortable and your incision is sealed and healed without apparent infection risk. [place] [facility] [employer]  Phone: [phone]      Please Continue a full liquid diet until 11/18/23 at which time you may commence the TIF diet as below, with soft food until you return to clinic. Post-op Foregut Diet    · Foods should be very soft consistency; semi solid or fork tender.     · Chew foods very well before swallowing; allow 20-30 minutes per meal.    · Eat every 3-4 hours, have 3 small meals and 2-3 small snacks    · Follow this diet for at least 3 weeks after surgery or until otherwise directed by your surgeon. Acceptable Foods     · Eggs or egg beaters  · [place] yogurt  · Cottage cheese  · Cheese sticks or shredded cheese  · Soft fish (salmon, white fish, tuna)  · Beans, lentils, or lentil soup  · Tuna or egg salad  · Refried beans  · Protein shakes  · Loosely cooked/crumbled ground turkey, chicken, or beef  · Thinned oatmeal or cream of wheat  · Over cooked, soft/mushy vegetables  · Fruits canned in natural juices  · Pureed food  · Sweet potatoes, well cooked, no skin  · Winter squash, well cooked, no skin  · Applesauce  · Tomato, carrot sylvie or butternut squash soup ·       Foods to Avoid  Solid/Tough meats: chicken, steak, hamburgers, hot dogs, pork chops, meatloaf, etc.   · Bread, rice, pasta, macaroni and cheese   · High-fat foods   · High-sugar foods   · Raw vegetables   · Junk food   · Crunchy foods (chips, popcorn, nuts, pumpkin and sunflower seeds)      Patient Instructions  Before and After  Your TIF Procedure  This brochure is intended to give you a general  overview of the TIF® Procedure instructions. ALWAYS follow your doctor’s pre-procedure  and post-procedure instructions. Pre-Procedure Instructions  Make sure you inform your doctor about all medications  you are currently taking and provide a full  history of your medical conditions. DO NOT take any diet aids or herbal supplements  that contain ginkgo, garlic, or Groom’s  Wort for 10 days prior to surgery. DO NOT take any aspirin, blood thinners, anti-inflammatory  (arthritis) medications, vitamin E or  fish oil for 7 days prior to your procedure. Your doctor will give you additional instructions  regarding medications you are currently taking.   DO NOT smoke or drink alcohol for 48 hours  prior to your procedure. DO NOT eat or drink anything for at least  12 hours before your procedure (or longer if  instructed by your doctor). The TIF procedure  cannot be performed if there is food in your  stomach. Take the medications your doctor  has approved for you to take with a small  amount of water. Post-Procedure Instructions  Your doctor will determine whether it is necessary  for you to spend the night in the hospital after  your procedure. For the first few days, you will  experience some pain and/or discomfort in your  chest and shoulder and you may have a sore  throat, and/or some discomfort swallowing. These  symptoms should resolve within the first week after  your procedure and appropriate medication will be  provided as needed. If symptoms do not resolve  or if discomfort becomes more severe, notify your  doctor immediately. Continue to take your GERD medication after your  procedure as recommended by your doctor. Occasional heartburn is normal in healthy people  and may depend on diet and other factors such  as stress. If GERD symptoms recur, you should  contact your doctor. Even though the TIF® Procedure is incisionless, it is  still surgery. Like any surgical procedure, success  is dependent on how well you adhere to postprocedure  instructions including:  Dietary guidelines  Physical activity, driving  Medications  Return to work  Follow-up  Dietary Guidelines  The strength of your new antireflux valve is largely  determined by how well it heals. What you eat and  drink can dramatically impact the durability of your  antireflux valve. You will be asked to follow a liquid  diet followed by a mashed and soft food diet as  your newly reconstructed valve heals. If you experience heartburn, write down the food  that gave you heartburn and avoid eating it. Talk  to your doctor at your next visit about your foodassociated  symptoms.  Remember, its normal for  some people (non-GERD patients) to occasionally  experience heartburn from specific foods, and this  may mean that your valve is functioning correctly. If your symptoms persist, contact your doctor  immediately. During the 6-week post-procedure period, it’s  important that you adhere to the following  guidelines:  Eat 4 to 5 small meals consisting of soft  foods throughout the day. Take small bites and chew your food  thoroughly for 30 seconds to avoid  swallowing a large bolus of food. Avoid foods with coarse texture: nuts, raw  fruits, and raw vegetables. Try not to vomit, cough, retch or strain. This  can significantly affect the healing and  effectiveness of your new antireflux valve. During the healing process, avoid foods and  drinks that triggered your reflux in the past.  You may reintroduce them slowly after  healing. To avoid chest pain take small bites, chew for  30 seconds and gradually thicken the texture  of your food. Remain in an upright position for 1 hour  after eating. Do not eat for at least 2 hours  before bedtime. Do not drink carbonated beverages  or alcohol. Avoid spicy foods. Avoid foods and drinks that are very hot  or very cold. Follow your doctor’s instructions to wean  yourself off antireflux medications. Do not smoke. Avoid gas-forming, acid producing foods,  or foods that slow gastric emptying such as  tomato-based products, peppermint, black  pepper, caffeinated drinks, alcohol, onions,  green peppers, fatty foods, beans, spicy  foods, citrus fruits, and ?venecia supplements. Taking over the counter anti-gas medications  may be helpful. The fi rst 2 weeks after your procedure are  extremely important. That’s why we ask you to be  particularly cautious with your diet. You will stay on clear liquids for the fi rst 1-3 days  after your TIF procedure. This diet contains only  fluids that are clear and very low in sugar. However,  it is not nutritionally balanced and will only be used  a few days.   Avoid beverages with alcohol, caffeine,  carbonation (soft drinks), or acidic drinks (tomato,  grapefruit, and orange juice). Foods that are allowed:  Water, plain or lightly flavored  (non-carbonated)  Milk, decaffeinated tea, caffeine-free  drinks  Diet decaffeinated drinks  (non-carbonated)  Diluted electrolyte drinks  Strained soups  Diluted, light, or diet apple or white  grape juice  Non-acidic fruit or vegetable juices  (without chunks)  Liquid puddings and creams  Sherbets or Ice-creams (without chunks)  Milkshakes  Baby food  Weeks 1 and 2: Liquid Diet  Be sure to drink a minimum of 4-8 oz of  water between each meal.  Do not take large gulps. Sip clear liquids  and rest between sips. Allow 20 minutes to  drink ½ cup. You may sip on fluids all day if  you wish but at least 6-8 times per day. Take your prescribed medications. PPIs  should be taken for at least 2 weeks  and wean off according to physician  recommendation. If pills/capsules are  larger than a peanut, discuss with your  pharmacist if they can be halved, crushed  or if there are liquid options available to  minimize hard swallowing. Take vitamin/mineral supplements every  day being mindful of pill/capsule size as  noted above. This will help prevent vitamin  and mineral defi ciencies. It’s helpful to eat a very low fat diet to  minimize heartburn symptoms. Restaurant foods are not recommended  during the fi rst few weeks. The following protein supplements can be  used starting on day 4:  Protein-enriched commercially available  shakes. You can also add one scoop of  concentrated protein powder to your  bowl of soup or glass of juice. During week 2, a liquid diet is still  recommended but you may add very liquid,  potato-based and non-stringy vegetable  mashes. This diet consists of high-protein full liquids and  blended solids.  The portions should remain small  and not exceed ½ cup to help prevent vomiting and  proper healing of your newly reconstructed valve. Your meals will be only liquid or blended. They may  include milk, vegetable or diluted fruit juice. Sip  liquid meals very slowly. Drink 4 oz (½ cup), over  20-30 minutes. Eat 4-6 small meals each day. The amount you  will be able to eat at one time is very small and  should not exceed ½ cup. Eat foods high in protein  because they help your body heal from surgery. Tips on how to blend foods:  Cut foods into small pieces  Place food into  or   Add liquid such as broth, juice, or milk  Blend or puree until smooth  Strain foods that do not blend in a  completely smooth consistency  Season foods to taste  ? Water, plain and lightly flavored  (non-carbonated)  ? Milk, decaffeinated tea,  caffeine-free drinks  ? Diet decaffeinated drinks  (non-carbonated)  ? Electrolyte drinks  ? Apple juice or white grape juice  ? Non-acidic fruit or vegetable juices  (without chunks)  ? Strained soups  ? Liquid puddings and creams  ? Sherbets and ice-creams  (without chunks)  ? Milk-shakes  ? Baby food  Recipes  Protein Fortifi ed Breakfast Drink  ½ packet [place] [employer] Per[employer] Pixel Breakfast  ½ scoop of whey protein powder  4 oz skim milk  Amount per serving (½ cup): 120 calories,  15 g protein  PB Protein Pudding  1 packet sugar-free pudding  ¼ cup dry milk  ¼ cup peanut butter  2 cups skim milk  Amount per serving (¼ cup): 100 calories,  6 g protein  Eggnog  ½ cup skim milk  ½ package [place] Instant Breakfast  ¼ cup liquid egg substitute  Amount per serving (½ cup): 110 calories,  13 g protein  Shopping list weeks 1-2  Post Procedure Day  0-3  Day  4-14  Weeks  3-4  Weeks  5-6  Clear liquids,  low in sugar  Water (non-carbonated) ? ? ? ? Milk, decaffeinated tea, caffeine free drinks ? ? ? ? Diet and decaffeinated drinks, diluted electrolyte drinks ? ? ? ? Broth of any kind, strained soups (not tomato based) ? ? ? ? Diluted, light or diet apple or white grape juice ? ? ? ?   Non-acidic fruit or vegetable juice (without chunks) ? ? ? ? Liquid puddings and creams ? ? ? ? Sherbets, ice-creams, milk shakes (without chunks) ? ? ? ? Full liquids  Drinkable yogurt (no chunks) ? ? ? ? Protein-enriched commercially available shakes ? ? ? ?  Very liquid, potato-based mash ? ? ? ? Non-stringy vegetable mash ? ? ? ?  Baby food ? ? ? ? Soft texture,  low fat food  Cottage cheese ? ? ? ? Oatmeal ? ? ? ? Well-cooked & pureed vegetables (mashed potatoes ? ? ? ? Canned fruit (without skins) ? ? ? ? Bananas, melons, berries ? ? ? ? Soft eggs, tofu ? ? ? ? Moist, mashed boneless fi sh ? ? ? ? Well-cooked lean ground food (e.g. turkey) ? ? ? ? Medium  texture  food  Small soft noodles ? ? ? ? Non-sticky rice ? ? ? ? Cereals (softened in milk) ? ? ? ? Soft cheeses ? ? ? ? Food Guide  Yes No ? ?  ? Milk, fruit and vegetable juices  ? Tea, coffee  ? Potatoes and/or vegetables to mash  ? Oatmeal  ? Puddings, ice-creams, sherbets  ? Butter and margarine  ? Soups (without chunks)  ? Tofu  ? Well-cooked ground food: slowly  introduce fi marivel ground fi sh  or turkey  ? Pasta (small noodles) and  non-sticky rice  ? Thicker soups or soups with small  pieces of vegetables  ? Sauces  ? Bananas  ? Soft cheeses  ? Vegetables (for cooking, steaming)  Shopping list weeks 3-4  Shopping list weeks 5-6  Weeks 3 and 4: Soft Diet  This diet consists of blended foods with one new  solid food added daily. Portions should be small  and not exceeding 1 cup to help prevent vomiting  and proper healing of your newly reconstructed  valve.   Foods that are allowed:  Water, milk, fruit juices and  vegetable juices  Tea and coffee (in small quantities)  Mashed vegetables and/or potatoes  Oatmeal  Puddings, ice-creams, sherbets  Butter and margarine  Soups (without chunks)  Tofu  Slowly introduce well-cooked, fi marivel  ground food such as fish or turkey  Food to avoid:  Raw or undercooked food  Alcoholic and carbonated beverages  Pasta, bread  Cakes, pancakes, waffles, cookies, etc.  Chips, french fries, popcorn, etc.  Pepper and hot sauces  Dry fruits and cereals  High-fat food  Consume vitamin-rich fruit juices each day. Refrain  from acidic fruit juices like orange, lemon or lime. Plum juice and/or apricot juice will help to  avoid constipation. Weeks 5 and 6: Solid Diet  Depending on your tolerance level you  may introduce:  Overcooked pasta (small noodles) and  non-sticky rice  Thicker soups or soups with small pieces  of vegetables  Sauces  Bananas  Soft cheeses  Cooked vegetables  Meatless casseroles  Food to avoid:  Fibrous meats  Fibrous vegetables  Eat seated, in a quiet place, without stress. Chew your food thoroughly. Eat slowly. Avoid consuming large quantities of food. Avoid carbonated beverages or alcohol. If you have a burning sensation after consuming a  particular food, try to avoid it and mention it to your  physician at your next visit. A burning sensation could mean that your newly  reconstructed valve is operating correctly. However,  if your symptoms persist, contact your physician as  soon as possible. At the start of the week 7 you can eat normal food. But try to continue eating small meals. Physical Activity  Walking is permitted and encouraged after your  procedure. Begin to walk short distances, at a slow  pace, and with someone who can assist you in  case you experience any residual weakness due  to anesthesia. Gradually increase the distance and  duration of your walks until you feel you are back  to normal. You may also climb stairs, although do it  slowly for the fi rst few weeks to reduce the risk of  increasing abdominal pressure. In order to give your newly reconstructive valve  time to heal and fuse, do not lift anything over 5  pounds for the fi rst 2 weeks. During weeks 3-6, you  may lift items up to 10 pounds. Beginning in week  7, lift items you normally would.   Sports and other intense exercise should be  avoided for the fi rst 6 weeks following your  procedure. Then consult with your doctor to  determine if you are ready to resume your normal  exercise routine. Driving may be resumed 1-2 days after the  procedure. Do not drive if you are taking  prescription pain medication, are experiencing  fatigue, or are feeling sore. Sex may be resumed after 7 days. Medications  Your doctor will determine your need for acidreducing  medications following your procedure. Before leaving the hospital, your physician may  prescribe pain medications. It is important that you  take this medication as prescribed. If your pain is  not well managed, contact your physician. Follow Up  After the procedure, your doctor will see you to  assess the effectiveness of the TIF procedure. Your doctor may also schedule additional follow-up  appointments. Return to Work  Most patients will be able to return to work 3-7 days  after the procedure. You and your doctor should  determine a timetable for returning to work based  on a number of factors including residual fatigue  from general anesthesia, any complications during  the procedure, your overall medical condition, and  your general need for recovery time. If you work in a job that requires  significant physical activity, you  should not resume all your normal  job functions until after your doctor  has cleared you to do so. If you are experiencing any of the  following symptoms after your  procedure, call your doctor immediately  or go to your doctor’s hospital’s  emergency room. Fever greater than 101º F  Increased upper  abdominal pain  Difficulty or pain while  swallowing  Sore throat lasting more than  seven days  Chest pain  Shoulder pain lasting more than  3-7 days  Any condition not improving  or worsening  Follow your doctor’s instructions to  wean off antireflux medications.   Monitoring your progress:  It’s helpful to chart your progress from before  your TIF procedure and after. Please answer these  questions today and check in again eight weeks  after your procedure and to see what’s changed. Rate how you are feelin = best, 3=neutral, 5 = worst  Before your  TIF procedure 0 1 2 3 4 5  How much does  heartburn bother you  on a daily basis? How much does  regurgitation bother you  on a daily basis? Do your reflux symptoms  prevent you from getting  a restful night of sleep? Does your reflux  condition impact your  daily activities? How much does coughing  bother you on a daily  basis? Does your reflux condition  impact your social life? How dependent are you  on medications to control  reflux symptoms? Overall satisfaction with  your health condition  Eight weeks after  TIF procedure 0 1 2 3 4 5  How much does  heartburn bother you  on a daily basis? How much does  regurgitation bother you  on a daily basis? Do your reflux symptoms  prevent you from getting  a restful night of sleep? Does your reflux  condition impact your  daily activities? How much does coughing  bother you on a daily  basis? Does your reflux condition  impact your social life? How dependent are you  on medications to control  reflux symptoms? Overall satisfaction with  your health condition  [facility] [address] [place] [address]  Tel: [phone] Fax: [phone]  [email or web address]  ©31 [employer]. All rights reserved. [employer], TIF,  EsophyX and [employer] are registered trademarks of [facility] Notice: While clinical studies support the effectiveness of TIF (Transoral  Incisionless Fundoplication) procedure in treating chronic GERD (gastroesophageal  reflux disease), individual results may vary. There are no guarantees of successful outcome. The TIF procedure may not be appropriate for every individual, and it may not be  applicable to your condition.  Always ask your doctor about all treatment options, as well  as their risks and benefits. Only your doctor can determine whether the TIF procedure is  appropriate for your situation.   [id number]

## 2023-11-15 NOTE — PROGRESS NOTES
Progress Note - General Surgery   Masha Rivera 24 y.o. male MRN: [id number]  Unit/Bed#: MG6 865-01 Encounter: [id number]    Assessment:  34-year-old male s/p laparoscopic hiatal hernia repair    AVSS satting 99% on room air  UOP 1.4L  11/15 labs pending  Plan:  -Tolerating CLD, can consider advancing to FLD  - MIVF  - PPI twice daily  - DVT PPx  - Analgesia and antiemetic as needed  - Monitor a.m. labs  - Encourage OOB and I-S use    Subjective/Objective   Subjective: Patient seen and examined at bedside. NAEO. Complaining of mild alber-incisional pain and back pain. He attributes the back pain mostly to the hospital bed. He denies N/V. Denies bowel function at this time. Tolerating CLD without issue. Objective:     Blood pressure 140/65, pulse 58, temperature 98.2 °F (36.8 °C), temperature source Oral, resp. rate 18, height 5' 7" (1.702 m), weight 86.2 kg (190 lb), SpO2 99 %. ,Body mass index is 29.76 kg/m². Intake/Output Summary (Last 24 hours) at 11/15/2023 0102  Last data filed at 11/14/2023 2201  Gross per 24 hour   Intake 2488.06 ml   Output 1450 ml   Net 1038.06 ml       Invasive Devices       Peripheral Intravenous Line  Duration             Peripheral IV 11/14/23 Left Hand <1 day    Peripheral IV 11/14/23 Right Forearm <1 day                    Physical Exam:   General - no acute distress, responsive and cooperative  CV - warm, regular rate  Pulm - normal work of breathing, no respiratory distress  Abd - soft, nondistended, appropriately tender to palpation. Surgical incision C/D/I and well approximated.   Neuro - m/s grossly intact, cn grossly intact  Ext - moving all extremities

## 2023-11-29 ENCOUNTER — OFFICE VISIT (OUTPATIENT)
Dept: SURGERY | Facility: CLINIC | Age: 23
End: 2023-11-29

## 2023-11-29 VITALS
WEIGHT: 186.8 LBS | HEART RATE: 65 BPM | SYSTOLIC BLOOD PRESSURE: 115 MMHG | DIASTOLIC BLOOD PRESSURE: 75 MMHG | TEMPERATURE: 98.1 F | RESPIRATION RATE: 18 BRPM | OXYGEN SATURATION: 97 % | HEIGHT: 67 IN | BODY MASS INDEX: 29.32 KG/M2

## 2023-11-29 DIAGNOSIS — K44.9 HIATAL HERNIA: Primary | ICD-10-CM

## 2023-11-29 PROCEDURE — 99024 POSTOP FOLLOW-UP VISIT: CPT | Performed by: SURGERY

## 2023-11-29 NOTE — PROGRESS NOTES
Assessment/Plan:    Hiatal hernia  26-year-old male status post laparoscopic paraesophageal hernia repair with intraoperative TIF with Dr. Ashley Hathaway on 11/14/2023, here for follow-up. Plan:  Overall, he is doing fairly well and is tolerating his full liquids without much difficulty. We discussed slowly advancing his diet back to all foods. This should occur over period of 2 to 3 weeks. Additionally, we discussed healthy eating habits, including taking small bites, chewing his food thoroughly, and 6 small meals per day. He should avoid heavy lifting for another 2 weeks, and I like to see him back in 1 month for next check. Diagnoses and all orders for this visit:    Hiatal hernia          Subjective:      Patient ID: Annia Chris is a 24 y.o. male. 26-year-old male status post laparoscopic paraesophageal hernia repair with intraoperative TIF Dr. Ashley Hathaway on 11/14/2023, here for follow-up. Overall, he is doing quite well without significant complaints. He denies any odynophagia or dysphagia. He is tolerating his full liquids without difficulty. He has noticed an occasional chest pressure which self resolved after about 5 minutes. Denies any fevers, chills, chest pain, shortness of breath. The following portions of the patient's history were reviewed and updated as appropriate: He  has a past medical history of Asthma and GERD (gastroesophageal reflux disease). He   Patient Active Problem List    Diagnosis Date Noted    Esophagitis 08/25/2023    Burping 08/25/2023    Hiatal hernia 08/25/2023    Vapes nicotine containing substance 03/24/2023     He  has a past surgical history that includes No past surgeries; EGD; [place] tooth extraction; pr laps rpr paraesphgl hrna incl fundplsty w/mesh (N/A, 11/14/2023); and pr esophagogastroduodenoscopy transoral diagnostic (N/A, 11/14/2023). His family history includes Colon cancer in his maternal grandmother. He  reports that he has never smoked.  He has never used smokeless tobacco. He reports current alcohol use. He reports current drug use. Drug: Marijuana. Current Outpatient Medications   Medication Sig Dispense Refill    acetaminophen (TYLENOL) 160 mg/5 mL suspension Take 20.3 mL (650 mg total) by mouth every 6 (six) hours  0    ondansetron (ZOFRAN) 4 mg tablet Take 1 tablet (4 mg total) by mouth every 6 (six) hours as needed for nausea or vomiting (Patient not taking: Reported on 11/29/2023) 30 tablet 0     No current facility-administered medications for this visit. Current Outpatient Medications on File Prior to Visit   Medication Sig    acetaminophen (TYLENOL) 160 mg/5 mL suspension Take 20.3 mL (650 mg total) by mouth every 6 (six) hours    ondansetron (ZOFRAN) 4 mg tablet Take 1 tablet (4 mg total) by mouth every 6 (six) hours as needed for nausea or vomiting (Patient not taking: Reported on 11/29/2023)     No current facility-administered medications on file prior to visit. He has No Known Allergies. .    Review of Systems   Constitutional:  Negative for appetite change, chills, diaphoresis and fever. HENT:  Negative for nosebleeds and trouble swallowing. Eyes: Negative. Respiratory:  Negative for cough, shortness of breath and wheezing. Cardiovascular:  Negative for chest pain, palpitations and leg swelling. Gastrointestinal:  Negative for abdominal distention, abdominal pain, nausea and vomiting. Genitourinary:  Negative for difficulty urinating, flank pain and frequency. Musculoskeletal:  Negative for arthralgias, joint swelling and myalgias. Skin:  Negative for pallor and rash. Neurological:  Negative for dizziness, facial asymmetry and speech difficulty. Hematological:  Does not bruise/bleed easily. Psychiatric/Behavioral:  Negative for agitation and confusion. All other systems reviewed and are negative.         Objective:      /75 (BP Location: Left arm, Patient Position: Sitting, Cuff Size: Large)   Pulse 65   Temp 98.1 °F (36.7 °C) (Temporal)   Resp 18   Ht 5' 7" (1.702 m)   Wt 84.7 kg (186 lb 12.8 oz)   SpO2 97%   BMI 29.26 kg/m²          Physical Exam  Vitals and nursing note reviewed. Constitutional:       General: He is not in acute distress. Appearance: Normal appearance. He is not toxic-appearing. HENT:      Head: Normocephalic and atraumatic. Mouth/Throat:      Mouth: Mucous membranes are moist.   Eyes:      Extraocular Movements: Extraocular movements intact. Pupils: Pupils are equal, round, and reactive to light. Cardiovascular:      Rate and Rhythm: Normal rate and regular rhythm. Pulses: Normal pulses. Pulmonary:      Effort: Pulmonary effort is normal. No respiratory distress. Breath sounds: Normal breath sounds. No wheezing. Abdominal:      General: There is no distension. Palpations: Abdomen is soft. There is no mass. Tenderness: There is no abdominal tenderness. There is no guarding or rebound. Hernia: No hernia is present. Comments: Well-healed laparoscopic port sites, no evidence of hernia. Musculoskeletal:         General: No swelling or deformity. Normal range of motion. Cervical back: Normal range of motion and neck supple. Right lower leg: No edema. Left lower leg: No edema. Skin:     General: Skin is warm and dry. Coloration: Skin is not jaundiced. Neurological:      General: No focal deficit present. Mental Status: He is alert and oriented to person, place, and time.    Psychiatric:         Mood and Affect: Mood normal.         Behavior: Behavior normal.

## 2024-01-03 ENCOUNTER — OFFICE VISIT (OUTPATIENT)
Dept: SURGERY | Facility: CLINIC | Age: 24
End: 2024-01-03

## 2024-01-03 VITALS
TEMPERATURE: 97.9 F | DIASTOLIC BLOOD PRESSURE: 70 MMHG | BODY MASS INDEX: 28.69 KG/M2 | WEIGHT: 182.8 LBS | RESPIRATION RATE: 18 BRPM | HEART RATE: 64 BPM | OXYGEN SATURATION: 98 % | SYSTOLIC BLOOD PRESSURE: 119 MMHG | HEIGHT: 67 IN

## 2024-01-03 DIAGNOSIS — K44.9 HIATAL HERNIA: Primary | ICD-10-CM

## 2024-01-03 PROCEDURE — 99024 POSTOP FOLLOW-UP VISIT: CPT | Performed by: SURGERY

## 2024-01-03 NOTE — PROGRESS NOTES
Assessment/Plan:    Hiatal hernia  23-year-old male status post laparoscopic paraesophageal hernia repair with intraoperative TIF on 11/14/2023, here for follow-up.    Plan:  He is doing exceptionally well and should continue with his slow increase in his dietary habits.  He has no restrictions at this point, but should continue with 6 small meals per day, chewing his food thoroughly, and taking small bites.  I like to see him back at 6 months postoperatively for next check.     Diagnoses and all orders for this visit:    Hiatal hernia          Subjective:      Patient ID: Gagandeep Chery is a 23 y.o. male.    Gagandeep Chery is a 23 y.o. male who presents s/p laparoscopic paraesophageal hernia repair with intraoperative TIF on 11/14/2023 for follow-up.  Overall, they are doing quite well without significant complaints.  They deny any significant pain, fevers, chills, chest pain, shortness of breath, nausea or vomiting.  They deny any significant dysphagia, odynophagia, or reflux.  They are tolerating their postop forgot diet and moving their bowels normally.  They have been doing most activities around the house, without restriction or limitation, while abiding by their lifting restrictions.  He has had 1 or 2 episodes where food moves slowly through the bottom portion of his esophagus but these have been few and far between.  He has been slowly increasing his diet back to all foods and consistencies.        The following portions of the patient's history were reviewed and updated as appropriate: He  has a past medical history of Asthma and GERD (gastroesophageal reflux disease).  He   Patient Active Problem List    Diagnosis Date Noted    Esophagitis 08/25/2023    Burping 08/25/2023    Hiatal hernia 08/25/2023    Vapes nicotine containing substance 03/24/2023     He  has a past surgical history that includes No past surgeries; EGD; [place] tooth extraction; pr laps rpr paraesphgl hrna incl fundplsty w/mesh (N/A,  11/14/2023); and pr esophagogastroduodenoscopy transoral diagnostic (N/A, 11/14/2023).  His family history includes Colon cancer in his maternal grandmother.  He  reports that he has never smoked. He uses smokeless tobacco. He reports current alcohol use. He reports current drug use. Drug: Marijuana.  Current Outpatient Medications   Medication Sig Dispense Refill    acetaminophen (TYLENOL) 160 mg/5 mL suspension Take 20.3 mL (650 mg total) by mouth every 6 (six) hours  0    ondansetron (ZOFRAN) 4 mg tablet Take 1 tablet (4 mg total) by mouth every 6 (six) hours as needed for nausea or vomiting (Patient not taking: Reported on 11/29/2023) 30 tablet 0     No current facility-administered medications for this visit.     Current Outpatient Medications on File Prior to Visit   Medication Sig    acetaminophen (TYLENOL) 160 mg/5 mL suspension Take 20.3 mL (650 mg total) by mouth every 6 (six) hours    ondansetron (ZOFRAN) 4 mg tablet Take 1 tablet (4 mg total) by mouth every 6 (six) hours as needed for nausea or vomiting (Patient not taking: Reported on 11/29/2023)     No current facility-administered medications on file prior to visit.     He has No Known Allergies..    Review of Systems   Constitutional:  Negative for appetite change, chills, diaphoresis and fever.   HENT:  Negative for nosebleeds and trouble swallowing.    Eyes: Negative.    Respiratory:  Negative for cough, shortness of breath and wheezing.    Cardiovascular:  Negative for chest pain, palpitations and leg swelling.   Gastrointestinal:  Negative for abdominal distention, abdominal pain, nausea and vomiting.   Genitourinary:  Negative for difficulty urinating, flank pain and frequency.   Musculoskeletal:  Negative for arthralgias, joint swelling and myalgias.   Skin:  Negative for pallor and rash.   Neurological:  Negative for dizziness, facial asymmetry and speech difficulty.   Hematological:  Does not bruise/bleed easily.   Psychiatric/Behavioral:   "Negative for agitation and confusion.    All other systems reviewed and are negative.        Objective:      /70 (BP Location: Left arm, Patient Position: Sitting, Cuff Size: Standard)   Pulse 64   Temp 97.9 °F (36.6 °C) (Temporal)   Resp 18   Ht 5' 7\" (1.702 m)   Wt 82.9 kg (182 lb 12.8 oz)   SpO2 98%   BMI 28.63 kg/m²          Physical Exam  Vitals and nursing note reviewed.   Constitutional:       General: He is not in acute distress.     Appearance: Normal appearance. He is not toxic-appearing.   HENT:      Head: Normocephalic and atraumatic.      Mouth/Throat:      Mouth: Mucous membranes are moist.   Eyes:      Extraocular Movements: Extraocular movements intact.      Pupils: Pupils are equal, round, and reactive to light.   Cardiovascular:      Rate and Rhythm: Normal rate and regular rhythm.      Pulses: Normal pulses.   Pulmonary:      Effort: Pulmonary effort is normal. No respiratory distress.      Breath sounds: Normal breath sounds. No wheezing.   Abdominal:      General: There is no distension.      Palpations: Abdomen is soft. There is no mass.      Tenderness: There is no abdominal tenderness. There is no guarding or rebound.      Hernia: No hernia is present.      Comments: Well-healed laparoscopic port sites, no evidence of hernia.   Musculoskeletal:         General: No swelling or deformity. Normal range of motion.      Cervical back: Normal range of motion and neck supple.      Right lower leg: No edema.      Left lower leg: No edema.   Skin:     General: Skin is warm and dry.      Coloration: Skin is not jaundiced.   Neurological:      General: No focal deficit present.      Mental Status: He is alert and oriented to person, place, and time.   Psychiatric:         Mood and Affect: Mood normal.         Behavior: Behavior normal.           "

## 2024-01-03 NOTE — ASSESSMENT & PLAN NOTE
23-year-old male status post laparoscopic paraesophageal hernia repair with intraoperative TIF on 11/14/2023, here for follow-up.    Plan:  He is doing exceptionally well and should continue with his slow increase in his dietary habits.  He has no restrictions at this point, but should continue with 6 small meals per day, chewing his food thoroughly, and taking small bites.  I like to see him back at 6 months postoperatively for next check.

## 2024-09-18 NOTE — ASSESSMENT & PLAN NOTE
54-year-old male status post laparoscopic paraesophageal hernia repair with intraoperative TIF with Dr. Murray Lilly on 11/14/2023, here for follow-up. Plan:  Overall, he is doing fairly well and is tolerating his full liquids without much difficulty. We discussed slowly advancing his diet back to all foods. This should occur over period of 2 to 3 weeks. Additionally, we discussed healthy eating habits, including taking small bites, chewing his food thoroughly, and 6 small meals per day. He should avoid heavy lifting for another 2 weeks, and I like to see him back in 1 month for next check.
Hpi Title: Evaluation of Skin Lesions
How Severe Are Your Spot(S)?: mild
Have Your Spot(S) Been Treated In The Past?: has not been treated

## 2025-04-03 ENCOUNTER — OFFICE VISIT (OUTPATIENT)
Dept: FAMILY MEDICINE CLINIC | Facility: CLINIC | Age: 25
End: 2025-04-03
Payer: COMMERCIAL

## 2025-04-03 VITALS
RESPIRATION RATE: 18 BRPM | WEIGHT: 202.4 LBS | TEMPERATURE: 98.7 F | SYSTOLIC BLOOD PRESSURE: 120 MMHG | BODY MASS INDEX: 31.77 KG/M2 | DIASTOLIC BLOOD PRESSURE: 70 MMHG | HEIGHT: 67 IN | OXYGEN SATURATION: 97 % | HEART RATE: 66 BPM

## 2025-04-03 DIAGNOSIS — Z00.00 HEALTH MAINTENANCE EXAMINATION: Primary | ICD-10-CM

## 2025-04-03 PROBLEM — R14.2 BURPING: Status: RESOLVED | Noted: 2023-08-25 | Resolved: 2025-04-03

## 2025-04-03 PROBLEM — Z72.0 VAPES NICOTINE CONTAINING SUBSTANCE: Status: RESOLVED | Noted: 2023-03-24 | Resolved: 2025-04-03

## 2025-04-03 PROBLEM — K20.90 ESOPHAGITIS: Status: RESOLVED | Noted: 2023-08-25 | Resolved: 2025-04-03

## 2025-04-03 PROBLEM — K44.9 HIATAL HERNIA: Status: RESOLVED | Noted: 2023-08-25 | Resolved: 2025-04-03

## 2025-04-03 PROCEDURE — 99385 PREV VISIT NEW AGE 18-39: CPT | Performed by: FAMILY MEDICINE

## 2025-04-03 NOTE — PATIENT INSTRUCTIONS
"Appears to be in excellent health.  Recheck 1 year or as needed.  Patient Education     Routine physical for adults   The Basics   Written by the doctors and editors at [facility]   What is a physical? -- A physical is a routine visit, or \"check-up,\" with your doctor. You might also hear it called a \"wellness visit\" or \"preventive visit.\"  During each visit, the doctor will:   Ask about your physical and mental health   Ask about your habits, behaviors, and lifestyle   Do an exam   Give you vaccines if needed   Talk to you about any medicines you take   Give advice about your health   Answer your questions  Getting regular check-ups is an important part of taking care of your health. It can help your doctor find and treat any problems you have. But it's also important for preventing health problems.  A routine physical is different from a \"sick visit.\" A sick visit is when you see a doctor because of a health concern or problem. Since physicals are scheduled ahead of time, you can think about what you want to ask the doctor.  How often should I get a physical? -- It depends on your age and health. In general, for people age 21 years and older:   If you are younger than 50 years, you might be able to get a physical every 3 years.   If you are 50 years or older, your doctor might recommend a physical every year.  If you have an ongoing health condition, like diabetes or high blood pressure, your doctor will probably want to see you more often.  What happens during a physical? -- In general, each visit will include:   Physical exam - The doctor or nurse will check your height, weight, heart rate, and blood pressure. They will also look at your eyes and ears. They will ask about how you are feeling and whether you have any symptoms that bother you.   Medicines - It's a good idea to bring a list of all the medicines you take to each doctor visit. Your doctor will talk to you about your medicines and answer any questions. " "Tell them if you are having any side effects that bother you. You should also tell them if you are having trouble paying for any of your medicines.   Habits and behaviors - This includes:   Your diet   Your exercise habits   Whether you smoke, drink alcohol, or use drugs   Whether you are sexually active   Whether you feel safe at home  Your doctor will talk to you about things you can do to improve your health and lower your risk of health problems. They will also offer help and support. For example, if you want to quit smoking, they can give you advice and might prescribe medicines. If you want to improve your diet or get more physical activity, they can help you with this, too.   Lab tests, if needed - The tests you get will depend on your age and situation. For example, your doctor might want to check your:   Cholesterol   Blood sugar   Iron level   Vaccines - The recommended vaccines will depend on your age, health, and what vaccines you already had. Vaccines are very important because they can prevent certain serious or deadly infections.   Discussion of screening - \"Screening\" means checking for diseases or other health problems before they cause symptoms. Your doctor can recommend screening based on your age, risk, and preferences. This might include tests to check for:   Cancer, such as breast, prostate, cervical, ovarian, colorectal, prostate, lung, or skin cancer   Sexually transmitted infections, such as chlamydia and gonorrhea   Mental health conditions like depression and anxiety  Your doctor will talk to you about the different types of screening tests. They can help you decide which screenings to have. They can also explain what the results might mean.   Answering questions - The physical is a good time to ask the doctor or nurse questions about your health. If needed, they can refer you to other doctors or specialists, too.  Adults older than 65 years often need other care, too. As you get older, " your doctor will talk to you about:   How to prevent falling at home   Hearing or vision tests   Memory testing   How to take your medicines safely   Making sure that you have the help and support you need at home  All topics are updated as new evidence becomes available and our peer review process is complete.  This topic retrieved from [employer] on: May 02, 2024.  Topic 472234 Version 1.0  Release: 32.4.3 - C32.122  © 2024 UpToDate, [employer] and/or its affiliates. All rights reserved.  Consumer Information Use and Disclaimer   Disclaimer: This generalized information is a limited summary of diagnosis, treatment, and/or medication information. It is not meant to be comprehensive and should be used as a tool to help the user understand and/or assess potential diagnostic and treatment options. It does NOT include all information about conditions, treatments, medications, side effects, or risks that may apply to a specific patient. It is not intended to be medical advice or a substitute for the medical advice, diagnosis, or treatment of a health care provider based on the health care provider's examination and assessment of a patient's specific and unique circumstances. Patients must speak with a health care provider for complete information about their health, medical questions, and treatment options, including any risks or benefits regarding use of medications. This information does not endorse any treatments or medications as safe, effective, or approved for treating a specific patient. Up[employer], Inc[employer] and its affiliates disclaim any warranty or liability relating to this information or the use thereof.The use of this information is governed by the Terms of Use, available at https://www.wolterskluwer.com/en/know/clinical-effectiveness-terms. 2024© [employer]Date, Inc. and its affiliates and/or licensors. All rights reserved.  Copyright   © 2024 [employer], [employer] and/or its affiliates. All rights reserved.

## 2025-04-03 NOTE — PROGRESS NOTES
"Name: Gagandeep Chery      : 2000      MRN: [id number]  Encounter Provider: Reji Matias MD  Encounter Date: 4/3/2025   Encounter department: [facility]    Assessment & Plan  Health maintenance examination            Patient Instructions   Appears to be in excellent health.  Recheck 1 year or as needed.  Patient Education     Routine physical for adults   The Basics   Written by the doctors and editors at [facility]   What is a physical? -- A physical is a routine visit, or \"check-up,\" with your doctor. You might also hear it called a \"wellness visit\" or \"preventive visit.\"  During each visit, the doctor will:   Ask about your physical and mental health   Ask about your habits, behaviors, and lifestyle   Do an exam   Give you vaccines if needed   Talk to you about any medicines you take   Give advice about your health   Answer your questions  Getting regular check-ups is an important part of taking care of your health. It can help your doctor find and treat any problems you have. But it's also important for preventing health problems.  A routine physical is different from a \"sick visit.\" A sick visit is when you see a doctor because of a health concern or problem. Since physicals are scheduled ahead of time, you can think about what you want to ask the doctor.  How often should I get a physical? -- It depends on your age and health. In general, for people age 21 years and older:   If you are younger than 50 years, you might be able to get a physical every 3 years.   If you are 50 years or older, your doctor might recommend a physical every year.  If you have an ongoing health condition, like diabetes or high blood pressure, your doctor will probably want to see you more often.  What happens during a physical? -- In general, each visit will include:   Physical exam - The doctor or nurse will check your height, weight, heart rate, and blood pressure. They will also look at your eyes and " "ears. They will ask about how you are feeling and whether you have any symptoms that bother you.   Medicines - It's a good idea to bring a list of all the medicines you take to each doctor visit. Your doctor will talk to you about your medicines and answer any questions. Tell them if you are having any side effects that bother you. You should also tell them if you are having trouble paying for any of your medicines.   Habits and behaviors - This includes:   Your diet   Your exercise habits   Whether you smoke, drink alcohol, or use drugs   Whether you are sexually active   Whether you feel safe at home  Your doctor will talk to you about things you can do to improve your health and lower your risk of health problems. They will also offer help and support. For example, if you want to quit smoking, they can give you advice and might prescribe medicines. If you want to improve your diet or get more physical activity, they can help you with this, too.   Lab tests, if needed - The tests you get will depend on your age and situation. For example, your doctor might want to check your:   Cholesterol   Blood sugar   Iron level   Vaccines - The recommended vaccines will depend on your age, health, and what vaccines you already had. Vaccines are very important because they can prevent certain serious or deadly infections.   Discussion of screening - \"Screening\" means checking for diseases or other health problems before they cause symptoms. Your doctor can recommend screening based on your age, risk, and preferences. This might include tests to check for:   Cancer, such as breast, prostate, cervical, ovarian, colorectal, prostate, lung, or skin cancer   Sexually transmitted infections, such as chlamydia and gonorrhea   Mental health conditions like depression and anxiety  Your doctor will talk to you about the different types of screening tests. They can help you decide which screenings to have. They can also explain what the " results might mean.   Answering questions - The physical is a good time to ask the doctor or nurse questions about your health. If needed, they can refer you to other doctors or specialists, too.  Adults older than 65 years often need other care, too. As you get older, your doctor will talk to you about:   How to prevent falling at home   Hearing or vision tests   Memory testing   How to take your medicines safely   Making sure that you have the help and support you need at home  All topics are updated as new evidence becomes available and our peer review process is complete.  This topic retrieved from [employer] on: May 02, 2024.  Topic 289119 Version 1.0  Release: 32.4.3 - C32.122  © 2024 [employer] and/or its affiliates. All rights reserved.  Consumer Information Use and Disclaimer   Disclaimer: This generalized information is a limited summary of diagnosis, treatment, and/or medication information. It is not meant to be comprehensive and should be used as a tool to help the user understand and/or assess potential diagnostic and treatment options. It does NOT include all information about conditions, treatments, medications, side effects, or risks that may apply to a specific patient. It is not intended to be medical advice or a substitute for the medical advice, diagnosis, or treatment of a health care provider based on the health care provider's examination and assessment of a patient's specific and unique circumstances. Patients must speak with a health care provider for complete information about their health, medical questions, and treatment options, including any risks or benefits regarding use of medications. This information does not endorse any treatments or medications as safe, effective, or approved for treating a specific patient. Up[employer] Inc[employer] and its affiliates disclaim any warranty or liability relating to this information or the use thereof.The use of this information is governed by the Terms  of Use, available at https://www.woltersGoshiuwer.com/en/know/clinical-effectiveness-terms. 2024© Up[employer]Date, Inc. and its affiliates and/or licensors. All rights reserved.  Copyright   © 2024 Up[employer], Inc. and/or its affiliates. All rights reserved.         History of Present Illness     HPI  24-year-old male presents to establish himself and for physical exam.  Past medical history significant for asthma in childhood.  Had repair of a paraesophageal hernia done at the end of 2023 with good result.  Non-smoker.  Stopped vaping.  Social alcohol.  No allergies to medications.  Presently has no concerns.    Review of Systems    Past Medical History:   Diagnosis Date   • Asthma     Chilhood   • GERD (gastroesophageal reflux disease)      Past Surgical History:   Procedure Laterality Date   • EGD     • IL ESOPHAGOGASTRODUODENOSCOPY TRANSORAL DIAGNOSTIC N/A 11/14/2023    Procedure: ESOPHAGOGASTRODUODENOSCOPY (EGD);  Surgeon: Rolando Santiago MD;  Location: [facility];  Service: General   • IL LAPS RPR PARAESPHGL HRNA INCL FUNDPLSTY W/MESH N/A 11/14/2023    Procedure: REPAIR HERNIA PARAESOPHAGEAL  LAPAROSCOPIC; POSSIBLE MESH;  Surgeon: Rolando Santiago MD;  Location: [facility];  Service: General   • WISDOM TOOTH EXTRACTION       Social History     Social History Narrative    Lives with parents.    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    No present girlfriend.    Enjoys hiking, fishing, hunting.  Working out.     Current Outpatient Medications on File Prior to Visit   Medication Sig   • [DISCONTINUED] acetaminophen (TYLENOL) 160 mg/5 mL suspension Take 20.3 mL (650 mg total) by mouth every 6 (six) hours   • [DISCONTINUED] ondansetron (ZOFRAN) 4 mg tablet Take 1 tablet (4 mg total) by mouth every 6 (six) hours as needed for nausea or vomiting     No Known Allergies  Immunization History   Administered Date(s) Administered   • Adenovirus types 4 and 7 10/03/2018  "  • COVID-19 MODERNA VACC 0.5 ML IM 02/04/2021, 03/05/2021, 04/01/2022   • Hep B, Adolescent or Pediatric 10/03/2018, 11/07/2018, 04/08/2019   • Hep B, adult 04/08/2019   • IPV 11/07/2018   • Influenza, injectable, quadrivalent, preservative free 0.5 mL 11/07/2018, 11/21/2019   • Japanese Encephalitis IM 04/08/2019, 09/03/2019, 01/19/2021   • MMR 04/24/2019, 01/19/2021   • Meningococcal MCV4P 10/03/2018   • Tdap 10/03/2018, 06/05/2023   • Tuberculin Skin Test-PPD Intradermal 09/28/2018   • Typhoid, ViCPs 04/08/2019   • Varicella 04/08/2019   • influenza, injectable, quadrivalent 10/20/2020, 10/15/2021     Objective   /70 (BP Location: Left arm, Patient Position: Sitting, Cuff Size: Large)   Pulse 66   Temp 98.7 °F (37.1 °C) (Temporal)   Resp 18   Ht 5' 7\" (1.702 m)   Wt 91.8 kg (202 lb 6.4 oz)   SpO2 97%   BMI 31.70 kg/m²     Physical Exam  Healthy appearing individual in no acute distress.  Extraocular motions are intact.  Both ear drums are white.  Hearing is grossly intact.  Throat reveals no erythema.  Teeth are in good repair.  No neck nodes or thyromegaly.  Lungs are clear.  Heart regular with no murmurs or gallops.  Abdomen is soft and nontender.  No leg edema.  Skin reveals no apparent rash. Multiple tattos.   Neurologic grossly within normal limits.  Normal mood and affect.  Musculoskeletal exam grossly within normal limits.      "

## (undated) DEVICE — SUT VICRYL PLUS 0 UR-6 27IN VCP603H

## (undated) DEVICE — TRAY FOLEY 16FR URIMETER SURESTEP

## (undated) DEVICE — INSUFLATION TUBING INSUFLOW (LEXION)

## (undated) DEVICE — TUBING SUCTION 5MM X 12 FT

## (undated) DEVICE — TRAVELKIT CONTAINS FIRST STEP KIT (200ML EP-4 KIT) AND SOILED SCOPE BAG - 1 KIT: Brand: TRAVELKIT CONTAINS FIRST STEP KIT AND SOILED SCOPE BAG

## (undated) DEVICE — SUT ETHIBOND 0 SH/SH 36 IN X524H

## (undated) DEVICE — DRAPE LAPAROTOMY W/POUCHES

## (undated) DEVICE — PENROSE DRAIN, 18 X 3 8: Brand: CARDINAL HEALTH

## (undated) DEVICE — HARMONIC 1100 SHEARS, 36CM SHAFT LENGTH: Brand: HARMONIC

## (undated) DEVICE — Device

## (undated) DEVICE — FIRST STEP BEDSIDE KIT - STAND-UP POUCH, ENDOSCOPIC CLEANING PAD - 1 POUCH: Brand: FIRST STEP BEDSIDE KIT - STAND-UP POUCH, ENDOSCOPIC CLEANING PAD

## (undated) DEVICE — ADHESIVE SKIN HIGH VISCOSITY EXOFIN 1ML

## (undated) DEVICE — SUT MONOCRYL 4-0 PS-2 18 IN Y496G

## (undated) DEVICE — LIGAMAX 5 MM ENDOSCOPIC MULTIPLE CLIP APPLIER: Brand: LIGAMAX

## (undated) DEVICE — Device: Brand: OMNICLOSE TROCAR SITE CLOSURE DEVICE

## (undated) DEVICE — 40595 XL TRENDELENBURG POSITIONING KIT: Brand: 40595 XL TRENDELENBURG POSITIONING KIT

## (undated) DEVICE — AIR AND WATER TUBING/CAP SET FOR OLYMPUS® SCOPES: Brand: ERBE

## (undated) DEVICE — CHLORAPREP HI-LITE 26ML ORANGE

## (undated) DEVICE — INSUFFLATION NEEDLE TO ESTABLISH PNEUMOPERITONEUM.: Brand: INSUFFLATION NEEDLE

## (undated) DEVICE — INVIEW CLEAR LEGGINGS: Brand: CONVERTORS

## (undated) DEVICE — INTENDED FOR TISSUE SEPARATION, AND OTHER PROCEDURES THAT REQUIRE A SHARP SURGICAL BLADE TO PUNCTURE OR CUT.: Brand: BARD-PARKER SAFETY BLADES SIZE 11, STERILE

## (undated) DEVICE — VISUALIZATION SYSTEM: Brand: CLEARIFY

## (undated) DEVICE — TROCAR: Brand: KII SLEEVE

## (undated) DEVICE — METZENBAUM ADTEC SINGLE USE DISSECTING SCISSORS, SHAFT ONLY, MONOPOLAR, CURVED TO LEFT, WORKING LENGTH: 12 1/4", (310 MM), DIAM. 5 MM, INSULATED, DOUBLE ACTION, STERILE, DISPOSABLE, PACKAGE OF 10 PIECES: Brand: AESCULAP

## (undated) DEVICE — TROCAR: Brand: KII FIOS FIRST ENTRY

## (undated) DEVICE — GLOVE SRG BIOGEL ECLIPSE 7.5

## (undated) DEVICE — GLOVE SRG BIOGEL 7.5

## (undated) DEVICE — SUT ETHIBOND 2-0 SH/SH 36 IN X523H

## (undated) DEVICE — Device: Brand: DEFENDO AIR/WATER/SUCTION AND BIOPSY VALVE

## (undated) DEVICE — PACK PBDS LAP CHOLE RF

## (undated) DEVICE — SINGLE KIT CONSISTS OF 1 EACH R2007 ESOPHYX Z+ FASTENER DELIVERY DEVICE AND 1 EACH R2375 SEROSAFUSE IMPLANTABLE FASTENER CARTRIDGE: Brand: SEROSAFUSE® IMPLANTABLE FASTENER KIT

## (undated) DEVICE — TUBING SMOKE EVAC W/FILTRATION DEVICE PLUMEPORT ACTIV